# Patient Record
Sex: MALE | Race: WHITE | Employment: OTHER | URBAN - NONMETROPOLITAN AREA
[De-identification: names, ages, dates, MRNs, and addresses within clinical notes are randomized per-mention and may not be internally consistent; named-entity substitution may affect disease eponyms.]

---

## 2017-07-19 ENCOUNTER — HOSPITAL ENCOUNTER (OUTPATIENT)
Age: 52
Discharge: HOME | End: 2017-07-19
Payer: COMMERCIAL

## 2017-07-19 LAB
ABSOLUTE EOS #: 0.1 K/UL (ref 0–0.4)
ABSOLUTE LYMPH #: 0.8 K/UL (ref 0.9–2.5)
ABSOLUTE MONO #: 0.4 K/UL (ref 0–1)
ALBUMIN SERPL-MCNC: 4.6 G/DL (ref 3.5–5.2)
ALBUMIN/GLOBULIN RATIO: ABNORMAL (ref 1–2.5)
ALP BLD-CCNC: 59 U/L (ref 40–129)
ALT SERPL-CCNC: 7 U/L (ref 5–41)
ANION GAP SERPL CALCULATED.3IONS-SCNC: 14 MMOL/L (ref 9–17)
AST SERPL-CCNC: 13 U/L
BASOPHILS # BLD: 0 %
BASOPHILS ABSOLUTE: 0 K/UL (ref 0–0.2)
BILIRUB SERPL-MCNC: 0.69 MG/DL (ref 0.3–1.2)
BUN BLDV-MCNC: 23 MG/DL (ref 6–20)
BUN/CREAT BLD: 23 (ref 9–20)
CALCIUM SERPL-MCNC: 9.6 MG/DL (ref 8.6–10.4)
CHLORIDE BLD-SCNC: 100 MMOL/L (ref 98–107)
CO2: 24 MMOL/L (ref 20–31)
CREAT SERPL-MCNC: 0.98 MG/DL (ref 0.7–1.2)
DIFFERENTIAL TYPE: YES
EOSINOPHILS RELATIVE PERCENT: 1 %
GFR AFRICAN AMERICAN: >60 ML/MIN
GFR NON-AFRICAN AMERICAN: >60 ML/MIN
GFR SERPL CREATININE-BSD FRML MDRD: ABNORMAL ML/MIN/{1.73_M2}
GFR SERPL CREATININE-BSD FRML MDRD: ABNORMAL ML/MIN/{1.73_M2}
GLUCOSE BLD-MCNC: 123 MG/DL (ref 70–99)
HCT VFR BLD CALC: 42.5 % (ref 41–53)
HEMOGLOBIN: 14.6 G/DL (ref 13.5–17.5)
LYMPHOCYTES # BLD: 13 %
MCH RBC QN AUTO: 30.1 PG (ref 26–34)
MCHC RBC AUTO-ENTMCNC: 34.3 G/DL (ref 31–37)
MCV RBC AUTO: 87.6 FL (ref 80–100)
MONOCYTES # BLD: 7 %
PDW BLD-RTO: 13.1 % (ref 12.1–15.2)
PLATELET # BLD: 242 K/UL (ref 140–450)
PLATELET ESTIMATE: ABNORMAL
PMV BLD AUTO: ABNORMAL FL (ref 6–12)
POTASSIUM SERPL-SCNC: 4.1 MMOL/L (ref 3.7–5.3)
RBC # BLD: 4.86 M/UL (ref 4.5–5.9)
RBC # BLD: ABNORMAL 10*6/UL
SEG NEUTROPHILS: 79 %
SEGMENTED NEUTROPHILS ABSOLUTE COUNT: 4.6 K/UL (ref 2.1–6.5)
SODIUM BLD-SCNC: 138 MMOL/L (ref 135–144)
T4 TOTAL: 5.3 UG/DL (ref 4.5–12)
TOTAL PROTEIN: 7 G/DL (ref 6.4–8.3)
TSH SERPL DL<=0.05 MIU/L-ACNC: 1.46 MIU/L (ref 0.3–5)
WBC # BLD: 5.9 K/UL (ref 3.5–11)
WBC # BLD: ABNORMAL 10*3/UL

## 2017-07-19 PROCEDURE — 84443 ASSAY THYROID STIM HORMONE: CPT

## 2017-07-19 PROCEDURE — 80053 COMPREHEN METABOLIC PANEL: CPT

## 2017-07-19 PROCEDURE — 36415 COLL VENOUS BLD VENIPUNCTURE: CPT

## 2017-07-19 PROCEDURE — 85025 COMPLETE CBC W/AUTO DIFF WBC: CPT

## 2017-07-19 PROCEDURE — 84436 ASSAY OF TOTAL THYROXINE: CPT

## 2018-06-13 RX ORDER — QUETIAPINE FUMARATE 100 MG/1
TABLET, FILM COATED ORAL
Refills: 2 | COMMUNITY
Start: 2018-05-30 | End: 2018-07-11

## 2018-06-14 ENCOUNTER — OFFICE VISIT (OUTPATIENT)
Dept: FAMILY MEDICINE CLINIC | Age: 53
End: 2018-06-14
Payer: COMMERCIAL

## 2018-06-14 VITALS
WEIGHT: 158 LBS | SYSTOLIC BLOOD PRESSURE: 100 MMHG | RESPIRATION RATE: 18 BRPM | HEIGHT: 69 IN | DIASTOLIC BLOOD PRESSURE: 60 MMHG | BODY MASS INDEX: 23.4 KG/M2 | HEART RATE: 68 BPM

## 2018-06-14 DIAGNOSIS — R53.83 OTHER FATIGUE: ICD-10-CM

## 2018-06-14 DIAGNOSIS — R29.898 LEFT ARM WEAKNESS: ICD-10-CM

## 2018-06-14 DIAGNOSIS — R63.4 WEIGHT LOSS, UNINTENTIONAL: ICD-10-CM

## 2018-06-14 DIAGNOSIS — Z12.11 ENCOUNTER FOR SCREENING COLONOSCOPY: ICD-10-CM

## 2018-06-14 DIAGNOSIS — Z13.220 LIPID SCREENING: ICD-10-CM

## 2018-06-14 DIAGNOSIS — Z13.31 POSITIVE DEPRESSION SCREENING: ICD-10-CM

## 2018-06-14 DIAGNOSIS — K59.00 CONSTIPATION, UNSPECIFIED CONSTIPATION TYPE: Primary | ICD-10-CM

## 2018-06-14 DIAGNOSIS — M25.512 LEFT SHOULDER PAIN, UNSPECIFIED CHRONICITY: ICD-10-CM

## 2018-06-14 PROCEDURE — G8431 POS CLIN DEPRES SCRN F/U DOC: HCPCS | Performed by: INTERNAL MEDICINE

## 2018-06-14 PROCEDURE — 1036F TOBACCO NON-USER: CPT | Performed by: INTERNAL MEDICINE

## 2018-06-14 PROCEDURE — G8427 DOCREV CUR MEDS BY ELIG CLIN: HCPCS | Performed by: INTERNAL MEDICINE

## 2018-06-14 PROCEDURE — 3017F COLORECTAL CA SCREEN DOC REV: CPT | Performed by: INTERNAL MEDICINE

## 2018-06-14 PROCEDURE — 99204 OFFICE O/P NEW MOD 45 MIN: CPT | Performed by: INTERNAL MEDICINE

## 2018-06-14 PROCEDURE — G0444 DEPRESSION SCREEN ANNUAL: HCPCS | Performed by: INTERNAL MEDICINE

## 2018-06-14 PROCEDURE — G8420 CALC BMI NORM PARAMETERS: HCPCS | Performed by: INTERNAL MEDICINE

## 2018-06-14 RX ORDER — MELOXICAM 7.5 MG/1
7.5 TABLET ORAL DAILY
Qty: 30 TABLET | Refills: 0 | Status: SHIPPED | OUTPATIENT
Start: 2018-06-14 | End: 2018-07-12 | Stop reason: SDUPTHER

## 2018-06-14 ASSESSMENT — ENCOUNTER SYMPTOMS
COUGH: 0
SHORTNESS OF BREATH: 0
VOMITING: 0
DIARRHEA: 0
BLOATING: 0
NAUSEA: 0
HEMATOCHEZIA: 0
RECTAL PAIN: 0
ORTHOPNEA: 0
SORE THROAT: 0
ABDOMINAL PAIN: 0
HEARTBURN: 0
CONSTIPATION: 1
BLURRED VISION: 0

## 2018-06-14 ASSESSMENT — PATIENT HEALTH QUESTIONNAIRE - PHQ9
SUM OF ALL RESPONSES TO PHQ9 QUESTIONS 1 & 2: 6
4. FEELING TIRED OR HAVING LITTLE ENERGY: 3
2. FEELING DOWN, DEPRESSED OR HOPELESS: 3
5. POOR APPETITE OR OVEREATING: 2
10. IF YOU CHECKED OFF ANY PROBLEMS, HOW DIFFICULT HAVE THESE PROBLEMS MADE IT FOR YOU TO DO YOUR WORK, TAKE CARE OF THINGS AT HOME, OR GET ALONG WITH OTHER PEOPLE: 3
3. TROUBLE FALLING OR STAYING ASLEEP: 3
8. MOVING OR SPEAKING SO SLOWLY THAT OTHER PEOPLE COULD HAVE NOTICED. OR THE OPPOSITE, BEING SO FIGETY OR RESTLESS THAT YOU HAVE BEEN MOVING AROUND A LOT MORE THAN USUAL: 3
7. TROUBLE CONCENTRATING ON THINGS, SUCH AS READING THE NEWSPAPER OR WATCHING TELEVISION: 3
SUM OF ALL RESPONSES TO PHQ QUESTIONS 1-9: 25
9. THOUGHTS THAT YOU WOULD BE BETTER OFF DEAD, OR OF HURTING YOURSELF: 2
6. FEELING BAD ABOUT YOURSELF - OR THAT YOU ARE A FAILURE OR HAVE LET YOURSELF OR YOUR FAMILY DOWN: 3
1. LITTLE INTEREST OR PLEASURE IN DOING THINGS: 3

## 2018-06-15 ENCOUNTER — HOSPITAL ENCOUNTER (OUTPATIENT)
Dept: GENERAL RADIOLOGY | Age: 53
Discharge: HOME | End: 2018-06-17
Payer: COMMERCIAL

## 2018-06-15 ENCOUNTER — HOSPITAL ENCOUNTER (OUTPATIENT)
Age: 53
Discharge: HOME | End: 2018-06-15
Payer: COMMERCIAL

## 2018-06-15 ENCOUNTER — HOSPITAL ENCOUNTER (OUTPATIENT)
Age: 53
End: 2018-06-15
Payer: COMMERCIAL

## 2018-06-15 DIAGNOSIS — R63.4 WEIGHT LOSS, UNINTENTIONAL: ICD-10-CM

## 2018-06-15 DIAGNOSIS — M25.512 ACUTE PAIN OF LEFT SHOULDER: ICD-10-CM

## 2018-06-15 DIAGNOSIS — M25.512 ACUTE PAIN OF LEFT SHOULDER: Primary | ICD-10-CM

## 2018-06-15 DIAGNOSIS — M25.512 LEFT SHOULDER PAIN, UNSPECIFIED CHRONICITY: ICD-10-CM

## 2018-06-15 DIAGNOSIS — Z13.220 LIPID SCREENING: ICD-10-CM

## 2018-06-15 DIAGNOSIS — R53.83 OTHER FATIGUE: ICD-10-CM

## 2018-06-15 LAB
ABSOLUTE EOS #: 0.1 K/UL (ref 0–0.4)
ABSOLUTE IMMATURE GRANULOCYTE: NORMAL K/UL (ref 0–0.3)
ABSOLUTE LYMPH #: 1 K/UL (ref 1–4.8)
ABSOLUTE MONO #: 0.3 K/UL (ref 0–1)
ANION GAP SERPL CALCULATED.3IONS-SCNC: 10 MMOL/L (ref 9–17)
BASOPHILS # BLD: 1 % (ref 0–2)
BASOPHILS ABSOLUTE: 0 K/UL (ref 0–0.2)
BUN BLDV-MCNC: 15 MG/DL (ref 6–20)
BUN/CREAT BLD: 15 (ref 9–20)
CALCIUM SERPL-MCNC: 10 MG/DL (ref 8.6–10.4)
CHLORIDE BLD-SCNC: 99 MMOL/L (ref 98–107)
CHOLESTEROL/HDL RATIO: 3.8
CHOLESTEROL: 238 MG/DL
CO2: 28 MMOL/L (ref 20–31)
CREAT SERPL-MCNC: 1 MG/DL (ref 0.7–1.2)
DIFFERENTIAL TYPE: YES
EOSINOPHILS RELATIVE PERCENT: 3 % (ref 0–5)
GFR AFRICAN AMERICAN: >60 ML/MIN
GFR NON-AFRICAN AMERICAN: >60 ML/MIN
GFR SERPL CREATININE-BSD FRML MDRD: ABNORMAL ML/MIN/{1.73_M2}
GFR SERPL CREATININE-BSD FRML MDRD: ABNORMAL ML/MIN/{1.73_M2}
GLUCOSE BLD-MCNC: 101 MG/DL (ref 70–99)
HCT VFR BLD CALC: 42.5 % (ref 41–53)
HDLC SERPL-MCNC: 62 MG/DL
HEMOGLOBIN: 14.7 G/DL (ref 13.5–17.5)
IMMATURE GRANULOCYTES: NORMAL %
LDL CHOLESTEROL: 158 MG/DL (ref 0–130)
LYMPHOCYTES # BLD: 24 % (ref 13–44)
MCH RBC QN AUTO: 30 PG (ref 26–34)
MCHC RBC AUTO-ENTMCNC: 34.5 G/DL (ref 31–37)
MCV RBC AUTO: 86.7 FL (ref 80–100)
MONOCYTES # BLD: 7 % (ref 5–9)
NRBC AUTOMATED: NORMAL PER 100 WBC
PATIENT FASTING?: YES
PDW BLD-RTO: 12.8 % (ref 12.1–15.2)
PLATELET # BLD: 244 K/UL (ref 140–450)
PLATELET ESTIMATE: NORMAL
PMV BLD AUTO: NORMAL FL (ref 6–12)
POTASSIUM SERPL-SCNC: 4.4 MMOL/L (ref 3.7–5.3)
PROSTATE SPECIFIC ANTIGEN: 0.8 UG/L
RBC # BLD: 4.9 M/UL (ref 4.5–5.9)
RBC # BLD: NORMAL 10*6/UL
SEG NEUTROPHILS: 65 % (ref 39–75)
SEGMENTED NEUTROPHILS ABSOLUTE COUNT: 2.8 K/UL (ref 2.1–6.5)
SODIUM BLD-SCNC: 137 MMOL/L (ref 135–144)
TRIGL SERPL-MCNC: 89 MG/DL
TSH SERPL DL<=0.05 MIU/L-ACNC: 3.1 MIU/L (ref 0.3–5)
VLDLC SERPL CALC-MCNC: ABNORMAL MG/DL (ref 1–30)
WBC # BLD: 4.2 K/UL (ref 3.5–11)
WBC # BLD: NORMAL 10*3/UL

## 2018-06-15 PROCEDURE — 73030 X-RAY EXAM OF SHOULDER: CPT

## 2018-06-15 PROCEDURE — 80061 LIPID PANEL: CPT

## 2018-06-15 PROCEDURE — 85025 COMPLETE CBC W/AUTO DIFF WBC: CPT

## 2018-06-15 PROCEDURE — 84443 ASSAY THYROID STIM HORMONE: CPT

## 2018-06-15 PROCEDURE — G0103 PSA SCREENING: HCPCS

## 2018-06-15 PROCEDURE — 36415 COLL VENOUS BLD VENIPUNCTURE: CPT

## 2018-06-15 PROCEDURE — 80048 BASIC METABOLIC PNL TOTAL CA: CPT

## 2018-06-19 ENCOUNTER — OFFICE VISIT (OUTPATIENT)
Dept: SURGERY | Age: 53
End: 2018-06-19
Payer: COMMERCIAL

## 2018-06-19 VITALS
HEART RATE: 82 BPM | RESPIRATION RATE: 18 BRPM | BODY MASS INDEX: 23.49 KG/M2 | HEIGHT: 68 IN | DIASTOLIC BLOOD PRESSURE: 70 MMHG | SYSTOLIC BLOOD PRESSURE: 111 MMHG | WEIGHT: 155 LBS

## 2018-06-19 DIAGNOSIS — K59.00 CONSTIPATION, UNSPECIFIED CONSTIPATION TYPE: Primary | ICD-10-CM

## 2018-06-19 DIAGNOSIS — Z01.818 PREOPERATIVE TESTING: ICD-10-CM

## 2018-06-19 PROCEDURE — 99203 OFFICE O/P NEW LOW 30 MIN: CPT | Performed by: SURGERY

## 2018-06-19 PROCEDURE — 1036F TOBACCO NON-USER: CPT | Performed by: SURGERY

## 2018-06-19 PROCEDURE — G8427 DOCREV CUR MEDS BY ELIG CLIN: HCPCS | Performed by: SURGERY

## 2018-06-19 PROCEDURE — G8420 CALC BMI NORM PARAMETERS: HCPCS | Performed by: SURGERY

## 2018-06-19 PROCEDURE — 3017F COLORECTAL CA SCREEN DOC REV: CPT | Performed by: SURGERY

## 2018-06-19 RX ORDER — SODIUM, POTASSIUM,MAG SULFATES 17.5-3.13G
1 SOLUTION, RECONSTITUTED, ORAL ORAL ONCE
Qty: 2 BOTTLE | Refills: 0 | Status: CANCELLED | OUTPATIENT
Start: 2018-06-19 | End: 2018-06-19

## 2018-06-19 RX ORDER — POLYETHYLENE GLYCOL 3350, SODIUM CHLORIDE, POTASSIUM CHLORIDE, SODIUM BICARBONATE, AND SODIUM SULFATE 240; 5.84; 2.98; 6.72; 22.72 G/4L; G/4L; G/4L; G/4L; G/4L
4000 POWDER, FOR SOLUTION ORAL ONCE
Qty: 4000 ML | Refills: 0 | Status: SHIPPED | OUTPATIENT
Start: 2018-06-19 | End: 2018-06-19

## 2018-06-21 ENCOUNTER — HOSPITAL ENCOUNTER (OUTPATIENT)
Age: 53
Discharge: HOME | End: 2018-06-21
Payer: COMMERCIAL

## 2018-06-21 LAB
EKG ATRIAL RATE: 72 BPM
EKG P AXIS: 55 DEGREES
EKG P-R INTERVAL: 184 MS
EKG Q-T INTERVAL: 400 MS
EKG QRS DURATION: 110 MS
EKG QTC CALCULATION (BAZETT): 438 MS
EKG R AXIS: 61 DEGREES
EKG T AXIS: 57 DEGREES
EKG VENTRICULAR RATE: 72 BPM

## 2018-06-21 PROCEDURE — 93005 ELECTROCARDIOGRAM TRACING: CPT

## 2018-06-22 ENCOUNTER — HOSPITAL ENCOUNTER (OUTPATIENT)
Dept: CT IMAGING | Age: 53
Discharge: HOME | End: 2018-06-24
Payer: COMMERCIAL

## 2018-06-22 DIAGNOSIS — R29.898 LEFT ARM WEAKNESS: ICD-10-CM

## 2018-06-22 PROCEDURE — 70450 CT HEAD/BRAIN W/O DYE: CPT

## 2018-07-03 ENCOUNTER — APPOINTMENT (OUTPATIENT)
Dept: CT IMAGING | Age: 53
End: 2018-07-03
Payer: COMMERCIAL

## 2018-07-03 ENCOUNTER — HOSPITAL ENCOUNTER (EMERGENCY)
Age: 53
Discharge: HOME | End: 2018-07-03
Attending: FAMILY MEDICINE
Payer: COMMERCIAL

## 2018-07-03 VITALS
HEART RATE: 68 BPM | SYSTOLIC BLOOD PRESSURE: 135 MMHG | DIASTOLIC BLOOD PRESSURE: 89 MMHG | RESPIRATION RATE: 18 BRPM | OXYGEN SATURATION: 98 % | TEMPERATURE: 98.8 F

## 2018-07-03 DIAGNOSIS — R31.21 ASYMPTOMATIC MICROSCOPIC HEMATURIA: Primary | ICD-10-CM

## 2018-07-03 DIAGNOSIS — R10.31 ABDOMINAL PAIN, RIGHT LOWER QUADRANT: ICD-10-CM

## 2018-07-03 DIAGNOSIS — Z87.442 PERSONAL HISTORY OF KIDNEY STONES: ICD-10-CM

## 2018-07-03 LAB
-: ABNORMAL
ABSOLUTE EOS #: 0.2 K/UL (ref 0–0.4)
ABSOLUTE IMMATURE GRANULOCYTE: ABNORMAL K/UL (ref 0–0.3)
ABSOLUTE LYMPH #: 2.4 K/UL (ref 1–4.8)
ABSOLUTE MONO #: 0.6 K/UL (ref 0–1)
ALBUMIN SERPL-MCNC: 4.5 G/DL (ref 3.5–5.2)
ALBUMIN/GLOBULIN RATIO: ABNORMAL (ref 1–2.5)
ALP BLD-CCNC: 66 U/L (ref 40–129)
ALT SERPL-CCNC: 7 U/L (ref 5–41)
AMORPHOUS: ABNORMAL
ANION GAP SERPL CALCULATED.3IONS-SCNC: 12 MMOL/L (ref 9–17)
AST SERPL-CCNC: 13 U/L
BACTERIA: ABNORMAL
BASOPHILS # BLD: 0 % (ref 0–2)
BASOPHILS ABSOLUTE: 0 K/UL (ref 0–0.2)
BILIRUB SERPL-MCNC: 0.41 MG/DL (ref 0.3–1.2)
BILIRUBIN URINE: NEGATIVE
BUN BLDV-MCNC: 32 MG/DL (ref 6–20)
BUN/CREAT BLD: 26 (ref 9–20)
CALCIUM SERPL-MCNC: 9.6 MG/DL (ref 8.6–10.4)
CASTS UA: ABNORMAL /LPF
CHLORIDE BLD-SCNC: 102 MMOL/L (ref 98–107)
CO2: 27 MMOL/L (ref 20–31)
COLOR: ABNORMAL
COMMENT UA: ABNORMAL
CREAT SERPL-MCNC: 1.21 MG/DL (ref 0.7–1.2)
CRYSTALS, UA: ABNORMAL /HPF
DIFFERENTIAL TYPE: YES
EOSINOPHILS RELATIVE PERCENT: 2 % (ref 0–5)
EPITHELIAL CELLS UA: ABNORMAL /HPF
GFR AFRICAN AMERICAN: >60 ML/MIN
GFR NON-AFRICAN AMERICAN: >60 ML/MIN
GFR SERPL CREATININE-BSD FRML MDRD: ABNORMAL ML/MIN/{1.73_M2}
GFR SERPL CREATININE-BSD FRML MDRD: ABNORMAL ML/MIN/{1.73_M2}
GLUCOSE BLD-MCNC: 152 MG/DL (ref 70–99)
GLUCOSE URINE: NEGATIVE
HCT VFR BLD CALC: 40.4 % (ref 41–53)
HEMOGLOBIN: 13.9 G/DL (ref 13.5–17.5)
IMMATURE GRANULOCYTES: ABNORMAL %
INR BLD: 1.1
KETONES, URINE: ABNORMAL
LACTIC ACID, WHOLE BLOOD: NORMAL MMOL/L (ref 0.7–2.1)
LACTIC ACID: 1.8 MMOL/L (ref 0.5–2.2)
LEUKOCYTE ESTERASE, URINE: NEGATIVE
LIPASE: 37 U/L (ref 13–60)
LYMPHOCYTES # BLD: 32 % (ref 13–44)
MCH RBC QN AUTO: 30.1 PG (ref 26–34)
MCHC RBC AUTO-ENTMCNC: 34.4 G/DL (ref 31–37)
MCV RBC AUTO: 87.6 FL (ref 80–100)
MONOCYTES # BLD: 8 % (ref 5–9)
MUCUS: ABNORMAL
NITRITE, URINE: NEGATIVE
NRBC AUTOMATED: ABNORMAL PER 100 WBC
OTHER OBSERVATIONS UA: ABNORMAL
PDW BLD-RTO: 13.1 % (ref 12.1–15.2)
PH UA: 5 (ref 5–8)
PLATELET # BLD: 270 K/UL (ref 140–450)
PLATELET ESTIMATE: ABNORMAL
PMV BLD AUTO: ABNORMAL FL (ref 6–12)
POTASSIUM SERPL-SCNC: 4.4 MMOL/L (ref 3.7–5.3)
PROTEIN UA: ABNORMAL
PROTHROMBIN TIME: 10.5 SEC (ref 9–11.6)
RBC # BLD: 4.61 M/UL (ref 4.5–5.9)
RBC # BLD: ABNORMAL 10*6/UL
RBC UA: ABNORMAL /HPF (ref 0–2)
RENAL EPITHELIAL, UA: ABNORMAL /HPF
SEG NEUTROPHILS: 58 % (ref 39–75)
SEGMENTED NEUTROPHILS ABSOLUTE COUNT: 4.3 K/UL (ref 2.1–6.5)
SODIUM BLD-SCNC: 141 MMOL/L (ref 135–144)
SPECIFIC GRAVITY UA: 1.01 (ref 1–1.03)
TOTAL PROTEIN: 7 G/DL (ref 6.4–8.3)
TRICHOMONAS: ABNORMAL
TURBIDITY: ABNORMAL
URINE HGB: ABNORMAL
UROBILINOGEN, URINE: NORMAL
WBC # BLD: 7.4 K/UL (ref 3.5–11)
WBC # BLD: ABNORMAL 10*3/UL
WBC UA: ABNORMAL /HPF
YEAST: ABNORMAL

## 2018-07-03 PROCEDURE — 74174 CTA ABD&PLVS W/CONTRAST: CPT

## 2018-07-03 PROCEDURE — 99284 EMERGENCY DEPT VISIT MOD MDM: CPT

## 2018-07-03 PROCEDURE — 83690 ASSAY OF LIPASE: CPT

## 2018-07-03 PROCEDURE — 36415 COLL VENOUS BLD VENIPUNCTURE: CPT

## 2018-07-03 PROCEDURE — 6360000002 HC RX W HCPCS: Performed by: FAMILY MEDICINE

## 2018-07-03 PROCEDURE — 85610 PROTHROMBIN TIME: CPT

## 2018-07-03 PROCEDURE — 2580000003 HC RX 258: Performed by: FAMILY MEDICINE

## 2018-07-03 PROCEDURE — 83605 ASSAY OF LACTIC ACID: CPT

## 2018-07-03 PROCEDURE — 6370000000 HC RX 637 (ALT 250 FOR IP): Performed by: FAMILY MEDICINE

## 2018-07-03 PROCEDURE — 80053 COMPREHEN METABOLIC PANEL: CPT

## 2018-07-03 PROCEDURE — 6360000004 HC RX CONTRAST MEDICATION: Performed by: FAMILY MEDICINE

## 2018-07-03 PROCEDURE — 81001 URINALYSIS AUTO W/SCOPE: CPT

## 2018-07-03 PROCEDURE — 85025 COMPLETE CBC W/AUTO DIFF WBC: CPT

## 2018-07-03 PROCEDURE — 96374 THER/PROPH/DIAG INJ IV PUSH: CPT

## 2018-07-03 RX ORDER — TAMSULOSIN HYDROCHLORIDE 0.4 MG/1
0.4 CAPSULE ORAL DAILY
Status: DISCONTINUED | OUTPATIENT
Start: 2018-07-03 | End: 2018-07-03 | Stop reason: HOSPADM

## 2018-07-03 RX ORDER — HYDROCODONE BITARTRATE AND ACETAMINOPHEN 5; 325 MG/1; MG/1
1 TABLET ORAL EVERY 6 HOURS PRN
Qty: 10 TABLET | Refills: 0 | Status: SHIPPED | OUTPATIENT
Start: 2018-07-03 | End: 2018-07-06

## 2018-07-03 RX ORDER — ONDANSETRON 2 MG/ML
4 INJECTION INTRAMUSCULAR; INTRAVENOUS ONCE
Status: COMPLETED | OUTPATIENT
Start: 2018-07-03 | End: 2018-07-03

## 2018-07-03 RX ORDER — 0.9 % SODIUM CHLORIDE 0.9 %
500 INTRAVENOUS SOLUTION INTRAVENOUS ONCE
Status: COMPLETED | OUTPATIENT
Start: 2018-07-03 | End: 2018-07-03

## 2018-07-03 RX ORDER — TAMSULOSIN HYDROCHLORIDE 0.4 MG/1
0.4 CAPSULE ORAL DAILY
Qty: 10 CAPSULE | Refills: 0 | Status: SHIPPED | OUTPATIENT
Start: 2018-07-03 | End: 2019-03-13 | Stop reason: ALTCHOICE

## 2018-07-03 RX ADMIN — IOPAMIDOL 100 ML: 755 INJECTION, SOLUTION INTRAVENOUS at 01:40

## 2018-07-03 RX ADMIN — ONDANSETRON 4 MG: 2 INJECTION, SOLUTION INTRAMUSCULAR; INTRAVENOUS at 01:53

## 2018-07-03 RX ADMIN — TAMSULOSIN HYDROCHLORIDE 0.4 MG: 0.4 CAPSULE ORAL at 04:40

## 2018-07-03 RX ADMIN — SODIUM CHLORIDE 500 ML: 9 INJECTION, SOLUTION INTRAVENOUS at 01:19

## 2018-07-03 ASSESSMENT — PAIN DESCRIPTION - FREQUENCY: FREQUENCY: CONTINUOUS

## 2018-07-03 ASSESSMENT — PAIN DESCRIPTION - ORIENTATION: ORIENTATION: RIGHT;LOWER

## 2018-07-03 ASSESSMENT — PAIN DESCRIPTION - LOCATION: LOCATION: ABDOMEN

## 2018-07-03 ASSESSMENT — PAIN SCALES - GENERAL: PAINLEVEL_OUTOF10: 9

## 2018-07-03 ASSESSMENT — PAIN DESCRIPTION - PAIN TYPE: TYPE: ACUTE PAIN

## 2018-07-03 ASSESSMENT — PAIN DESCRIPTION - DESCRIPTORS: DESCRIPTORS: TENDER;SHARP

## 2018-07-03 NOTE — ED PROVIDER NOTES
of motion and normal strength all extremities appropriate to age. Neurological: Patient is alert and oriented to person, place, and time. patient displays no tremor. Patient displays no seizure activity. .    Skin: Skin is warm and dry. Patient is not diaphoretic. Psychiatric: Patient has a normal mood and somewhat quiet/flattened affect. Patient speech is normal and behavior is normal. Cognition and memory are normal.      DIFFERENTIAL DIAGNOSIS:   Appendicitis kidney stone pyelonephritis MSK aneurysm, constipation    DIAGNOSTIC RESULTS           RADIOLOGY: non-plain film images(s) such as CT, Ultrasound and MRI are read by the radiologist.  CTA ABDOMEN PELVIS W WO CONTRAST   Preliminary Result   Preliminary report only            Unremarkable CTA of the abdomen pelvis. There is actually a paucity of    atherosclerotic disease present. There certainly no aneurysm, dissection, or    focal stenosis present. No acute process is identified at the abdomen or pelvis.           LABS:   Labs Reviewed   CBC WITH AUTO DIFFERENTIAL - Abnormal; Notable for the following:        Result Value    Hematocrit 40.4 (*)     All other components within normal limits   COMPREHENSIVE METABOLIC PANEL - Abnormal; Notable for the following:     Glucose 152 (*)     BUN 32 (*)     CREATININE 1.21 (*)     Bun/Cre Ratio 26 (*)     All other components within normal limits   URINALYSIS WITH MICROSCOPIC - Abnormal; Notable for the following:     Color, UA ROSIBEL (*)     Turbidity UA HAZY (*)     Ketones, Urine TRACE (*)     Urine Hgb 3+ (*)     Protein, UA TRACE (*)     Yeast, UA OCCASIONAL (*)     All other components within normal limits   LIPASE   LACTIC ACID, PLASMA   PROTIME-INR       EMERGENCY DEPARTMENT COURSE:   Vitals:    Vitals:    07/03/18 0356 07/03/18 0404 07/03/18 0419 07/03/18 0434   BP: 110/89 115/71 (!) 141/99 135/89   Pulse:       Resp:       Temp:       TempSrc:       SpO2: 99% 99% 99% 98%     Patient history and physical exam taken at bedside with father present, discussed patient's symptoms and exam findings, discussed initial workup will include blood and urine studies, IV saline lock, will order CT abdomen and pelvis with IV contrast and ask radiology technician to allow imaging to go more caudad to catch more of the iliac arteries if possible. Patient resting in bed semi-Fowlers    EMR reviewed, noting history of depression, history of toe surgery, never smoker, medications reviewed    Patient was offered pain medication as well but declines at this time    Upon return from CT, patient did vomit, Zofran 4 mg IV ordered    Patient updated regarding overall lab workup, preliminary CT read, awaiting urinalysis, patient resting left lateral recumbent, father at bedside    Urinalysis reviewed    Discussed the patient overall workup, discussed his report right lower quadrant pain, personal history kidney stones, noted blood in the urine, discussed possible patient's having kidney stone at this time I'll he states in the past he barely can feel them. As patient has normal kidney function, and symptom onset is racing, we'll initiate patient on Flomax, prescription same, discussed importance of hydration, pain control, follow-up with PCP, we'll make referral to urology, return to ER symptoms change worsen or concerns    Patient has a very flattened affect throughout, attempted to question how much patient does understand questions and/or instructions, much of this was repeated the patient's father who is present in attendance throughout. FINAL IMPRESSION      1. Asymptomatic microscopic hematuria    2. Abdominal pain, right lower quadrant    3.  Personal history of kidney stones          DISPOSITION/PLAN   Discharge    PATIENT REFERRED TO:  [facility]    Call   As needed    Rod White MD  [address] [facility] [address] [phone]    Call   As needed    [facility]  [address] [facility]

## 2018-07-11 RX ORDER — QUETIAPINE FUMARATE 100 MG/1
TABLET, FILM COATED ORAL
Status: ON HOLD | COMMUNITY
End: 2019-01-17 | Stop reason: HOSPADM

## 2018-07-11 RX ORDER — MIRTAZAPINE 30 MG/1
TABLET, FILM COATED ORAL
Status: ON HOLD | COMMUNITY
End: 2019-01-17 | Stop reason: HOSPADM

## 2018-07-11 RX ORDER — TRAZODONE HYDROCHLORIDE 150 MG/1
TABLET ORAL
Status: ON HOLD | COMMUNITY
End: 2019-01-17 | Stop reason: HOSPADM

## 2018-07-12 ENCOUNTER — OFFICE VISIT (OUTPATIENT)
Dept: FAMILY MEDICINE CLINIC | Age: 53
End: 2018-07-12
Payer: COMMERCIAL

## 2018-07-12 VITALS
DIASTOLIC BLOOD PRESSURE: 60 MMHG | BODY MASS INDEX: 24.55 KG/M2 | WEIGHT: 162 LBS | HEIGHT: 68 IN | RESPIRATION RATE: 18 BRPM | HEART RATE: 80 BPM | SYSTOLIC BLOOD PRESSURE: 100 MMHG

## 2018-07-12 DIAGNOSIS — R53.83 OTHER FATIGUE: ICD-10-CM

## 2018-07-12 DIAGNOSIS — M19.012 OSTEOARTHRITIS OF LEFT SHOULDER, UNSPECIFIED OSTEOARTHRITIS TYPE: Primary | ICD-10-CM

## 2018-07-12 DIAGNOSIS — F32.A DEPRESSIVE DISORDER: ICD-10-CM

## 2018-07-12 PROCEDURE — 3017F COLORECTAL CA SCREEN DOC REV: CPT | Performed by: INTERNAL MEDICINE

## 2018-07-12 PROCEDURE — 1036F TOBACCO NON-USER: CPT | Performed by: INTERNAL MEDICINE

## 2018-07-12 PROCEDURE — 99213 OFFICE O/P EST LOW 20 MIN: CPT | Performed by: INTERNAL MEDICINE

## 2018-07-12 PROCEDURE — G8427 DOCREV CUR MEDS BY ELIG CLIN: HCPCS | Performed by: INTERNAL MEDICINE

## 2018-07-12 PROCEDURE — G8420 CALC BMI NORM PARAMETERS: HCPCS | Performed by: INTERNAL MEDICINE

## 2018-07-12 RX ORDER — MELOXICAM 7.5 MG/1
7.5 TABLET ORAL DAILY
Qty: 30 TABLET | Refills: 2 | Status: SHIPPED | OUTPATIENT
Start: 2018-07-12 | End: 2019-01-22

## 2018-07-12 ASSESSMENT — ENCOUNTER SYMPTOMS
SORE THROAT: 0
NAUSEA: 0
COUGH: 0
BLURRED VISION: 0
ABDOMINAL PAIN: 0
SHORTNESS OF BREATH: 0
HEARTBURN: 0

## 2018-07-12 NOTE — PROGRESS NOTES
kg)   BMI 24.63 kg/m²       Physical Exam   Constitutional: He is oriented to person, place, and time. He appears well-developed and well-nourished. No distress. HENT:   Head: Normocephalic and atraumatic. Mouth/Throat: Oropharynx is clear and moist. No oropharyngeal exudate. Neck: No thyromegaly present. Cardiovascular: Normal rate, regular rhythm and normal heart sounds. No murmur heard. Pulmonary/Chest: Effort normal and breath sounds normal. He has no wheezes. He has no rales. Abdominal: Soft. Bowel sounds are normal. He exhibits no distension and no mass. There is no tenderness. Musculoskeletal: Normal range of motion. He exhibits tenderness (left shoulder). Lymphadenopathy:     He has no cervical adenopathy. Neurological: He is alert and oriented to person, place, and time. No cranial nerve deficit. Flat affect   Skin: Skin is warm and dry. No rash noted. Psychiatric: He has a normal mood and affect. His behavior is normal. Judgment normal.   Vitals reviewed. Data:     Lab Results   Component Value Date     07/03/2018    K 4.4 07/03/2018     07/03/2018    CO2 27 07/03/2018    BUN 32 07/03/2018    CREATININE 1.21 07/03/2018    GLUCOSE 152 07/03/2018    PROT 7.0 07/03/2018    LABALBU 4.5 07/03/2018    BILITOT 0.41 07/03/2018    ALKPHOS 66 07/03/2018    AST 13 07/03/2018    ALT 7 07/03/2018     Lab Results   Component Value Date    WBC 7.4 07/03/2018    RBC 4.61 07/03/2018    HGB 13.9 07/03/2018    HCT 40.4 07/03/2018    MCV 87.6 07/03/2018    MCH 30.1 07/03/2018    MCHC 34.4 07/03/2018    RDW 13.1 07/03/2018     07/03/2018    MPV NOT REPORTED 07/03/2018     Lab Results   Component Value Date    TSH 3.10 06/15/2018     Lab Results   Component Value Date    CHOL 238 06/15/2018    HDL 62 06/15/2018    PSA 0.80 06/15/2018          Assessment & Plan        Diagnosis Orders   1.  Osteoarthritis of left shoulder, unspecified osteoarthritis type   [place] reports improvement with Mobic. Left shoulder x-ray from 6/15/18  revealed moderate osteoarthritis. We'll continue with meloxicam to help with pain and inflammation. Discussed referral to orthopedic surgeon in the future if pain gets worse meloxicam (MOBIC) 7.5 MG tablet   2. Depressive disorder   Corky follows up with psychiatrist Dr. Steffanie Marshall    3. Other fatigue   His TSH was normal, CBC and BMP unremarkable. CT head was unremarkable. Suspect fatigue is related to side effects from psych meds and also severe depression. Completed Refills   Requested Prescriptions     Signed Prescriptions Disp Refills    meloxicam (MOBIC) 7.5 MG tablet 30 tablet 2     Sig: Take 1 tablet by mouth daily     Return in about 6 months (around 1/12/2019) for arthritis. Orders Placed This Encounter   Medications    meloxicam (MOBIC) 7.5 MG tablet     Sig: Take 1 tablet by mouth daily     Dispense:  30 tablet     Refill:  2     No orders of the defined types were placed in this encounter. There are no Patient Instructions on file for this visit.     Electronically signed by Keyanna Barraza MD on 7/12/2018 at 9:31 AM           Completed Refills   Requested Prescriptions     Signed Prescriptions Disp Refills    meloxicam (MOBIC) 7.5 MG tablet 30 tablet 2     Sig: Take 1 tablet by mouth daily

## 2018-07-15 ASSESSMENT — ENCOUNTER SYMPTOMS
VOMITING: 0
SORE THROAT: 0
CHOKING: 0
CONSTIPATION: 1
TROUBLE SWALLOWING: 0
ABDOMINAL DISTENTION: 1
BACK PAIN: 0
SHORTNESS OF BREATH: 0
NAUSEA: 0
BLOOD IN STOOL: 0
COUGH: 0
ABDOMINAL PAIN: 0

## 2018-07-15 NOTE — PROGRESS NOTES
and affect. His behavior is normal. Judgment and thought content normal.   Nursing note and vitals reviewed. CTA ABDOMEN PELVIS W WO CONTRAST   Status: Final result   Order Providers     Authorizing Billing   MD Mike Anne MD   Replaced:  CT ABDOMEN PELVIS W IV CONTRAST          Signed by     Signed Date/Time  Phone Pager   Jesse Carlsoncarlo 7/03/2018 06:19 [phone]    [facility]     Read Date Phone Pager   Thierrymohsen Gonzalez Jul 3, 2018 [phone]    Routing History     Priority Sent On From To Message Type    7/3/2018  6:23 AM Atul, [facility] Incoming Radiant Results From [email or web address] P [facility] Ed Radiology F/U Results   Radiation Dose Estimates     No radiation information found for this patient   Narrative   CTA ABDOMEN PELVIS W WO CONTRAST       HISTORY: 59-year-old male with right sided abdominal pain, evaluate for a    possible aneurysm or dissection.       COMPARISON: None.           TECHNIQUE: Spiral axial CT imaging of the abdomen and pelvis was performed at    2.5 mm intervals throughout. 100 mL Isovue-370 was administered intravenously    and imaging was performed contrast in the arterial phase. Sagittal, coronal,    and MIP reconstructions are provided.           FINDINGS: There is good contrast opacification of the systemic arterial    structures. Visualized descending thoracic aorta and proximal abdominal aorta    are normal. Celiac axis, SMA, renal arteries, and VIKRAM are widely patent. There    some minimal calcified plaque along the infrarenal abdominal aorta and bottle    common iliacs. There is no aneurysm or dissection. Bilateral internal and    external iliac arteries are unremarkable. There is good runoff to the lower    extremities.       The liver is largely unremarkable. There is a tiny hypoattenuating focus in the    medial segment which is too small for characterization, measuring approximately    7 mm.  Given the size and conspicuity, I suspect a small cyst. The gallbladder,    spleen, pancreas, adrenal glands, and kidneys demonstrate an unremarkable    contrasted appearance. Colon is unremarkable. No wall thickening or pericolonic    inflammation is present. Normal appendix is visualized. Small bowel is normal    in caliber and course. Stomach is normal. There is no mass or adenopathy. No    free air or free fluid is present. Mild degenerative changes are present in the    hips and spine. Visualized lung bases are clear.               Impression       Unremarkable CTA of the abdomen and pelvis. There is actually a paucity of    atherosclerotic disease present. There is certainly no aneurysm, dissection, or    focal stenosis present. No acute process is identified at the abdomen or pelvis.      7/3/2018  1:20 AM - Atul, [facility] Incoming Lab Results From [facility]     Component Results     Component Value Ref Range & Units Status Collected Lab   WBC 7.4  3.5 - 11.0 k/uL Final 07/03/2018  1:15 AM [facility] Lab   RBC 4.61  4.5 - 5.9 m/uL Final 07/03/2018  1:15 AM [facility] Lab   Hemoglobin 13.9  13.5 - 17.5 g/dL Final 07/03/2018  1:15 AM [facility] Lab   Hematocrit 40.4   41 - 53 % Final 07/03/2018  1:15 AM [facility] Lab   MCV 87.6  80 - 100 fL Final 07/03/2018  1:15 AM [address] Lab   MCH 30.1  26 - 34 pg Final 07/03/2018  1:15 AM [facility] Lab   MCHC 34.4  31 - 37 g/dL Final 07/03/2018  1:15 AM [facility] Lab   RDW 13.1  12.1 - 15.2 % Final 07/03/2018  1:15 AM [address] Lab   Platelets 197  225 - 450 k/uL Final 07/03/2018  1:15 AM [address] Lab             7/3/2018  1:36 AM - Atul, pn Incoming Lab Results From [facility]     Component Results     Component Value Ref Range & Units Status Collected Lab   Glucose 152   70 - 99 mg/dL Final 07/03/2018  1:15 AM [address] Lab   BUN 32   6 - 20 mg/dL Final 07/03/2018  1:15 AM [address] Lab   CREATININE 1.21

## 2018-07-15 NOTE — PATIENT INSTRUCTIONS
Patient Education   Patient Education        Learning About Colonoscopy  What is a colonoscopy? A colonoscopy is a test (also called a procedure) that lets a doctor look inside your large intestine. The doctor uses a thin, lighted tube called a colonoscope. The doctor uses it to look for small growths called polyps, colon or rectal cancer (colorectal cancer), or other problems like bleeding. During the procedure, the doctor can take samples of tissue. The samples can then be checked for cancer or other conditions. The doctor can also take out polyps. How is colonoscopy done? This procedure is done in a doctor's office or a clinic or hospital. You will get medicine to help you relax and not feel pain. Some people find that they do not remember having the test because of the medicine. The doctor gently moves the colonoscope, or scope, through the colon. The scope is also a small video camera. It lets the doctor see the colon and take pictures. A colonoscopy usually takes 30 to 45 minutes. It may take longer if the doctor has to remove polyps. How do you prepare for the procedure? You need to clean out your colon before the procedure so the doctor can see all of your colon. You may start the cleaning process a day or two before the test. This depends on which \"colon prep\" your doctor recommends. To clean your colon, you stop eating solid foods and drink only clear liquids. You can have water, tea, coffee, clear juices, clear broths, flavored ice pops, and gelatin (such as Jell-O). Do not drink anything red or purple, such as grape juice or fruit punch. And do not eat red or purple foods, such as grape ice pops or cherry gelatin. The day or night before the procedure, you drink a large amount of a special liquid. This causes loose, frequent stools. You will go to the bathroom a lot. It is very important to drink all of the colon prep liquid. If you have problems drinking the liquid, call your doctor.   For there are small amounts of bright red blood on toilet paper or the surface of stools. This is because of enlarged veins near the rectum (hemorrhoids). A few changes in your diet and lifestyle may help you avoid ongoing constipation. Your doctor may also prescribe medicine to help loosen your stool. Some medicines can cause constipation. These include pain medicines and antidepressants. Tell your doctor about all the medicines you take. Your doctor may want to make a medicine change to ease your symptoms. Follow-up care is a key part of your treatment and safety. Be sure to make and go to all appointments, and call your doctor if you are having problems. It's also a good idea to know your test results and keep a list of the medicines you take. How can you care for yourself at home? · Drink plenty of fluids, enough so that your urine is light yellow or clear like water. If you have kidney, heart, or liver disease and have to limit fluids, talk with your doctor before you increase the amount of fluids you drink. · Include high-fiber foods in your diet each day. These include fruits, vegetables, beans, and whole grains. · Get at least 30 minutes of exercise on most days of the week. Walking is a good choice. You also may want to do other activities, such as running, swimming, cycling, or playing tennis or team sports. · Take a fiber supplement, such as Citrucel or Metamucil, every day. Read and follow all instructions on the label. · Schedule time each day for a bowel movement. A daily routine may help. Take your time having your bowel movement. · Support your feet with a small step stool when you sit on the toilet. This helps flex your hips and places your pelvis in a squatting position. · Your doctor may recommend an over-the-counter laxative to relieve your constipation. Examples are Milk of Magnesia and MiraLax. Read and follow all instructions on the label.  Do not use laxatives on a long-term

## 2018-08-01 RX ORDER — POLYETHYLENE GLYCOL 3350, SODIUM CHLORIDE, POTASSIUM CHLORIDE, SODIUM BICARBONATE, AND SODIUM SULFATE 240; 5.84; 2.98; 6.72; 22.72 G/4L; G/4L; G/4L; G/4L; G/4L
4000 POWDER, FOR SOLUTION ORAL ONCE
Qty: 4000 ML | Refills: 0 | Status: SHIPPED | OUTPATIENT
Start: 2018-08-01 | End: 2018-08-01

## 2018-08-21 DIAGNOSIS — F32.A DEPRESSIVE DISORDER: Primary | ICD-10-CM

## 2018-08-21 DIAGNOSIS — F42.9 OBSESSIVE-COMPULSIVE DISORDER, UNSPECIFIED TYPE: ICD-10-CM

## 2018-08-21 DIAGNOSIS — Z12.11 ENCOUNTER FOR SCREENING COLONOSCOPY: ICD-10-CM

## 2019-01-11 ENCOUNTER — HOSPITAL ENCOUNTER (EMERGENCY)
Age: 54
Discharge: ANOTHER HOSPITAL | End: 2019-01-11
Attending: EMERGENCY MEDICINE
Payer: COMMERCIAL

## 2019-01-11 ENCOUNTER — HOSPITAL ENCOUNTER (INPATIENT)
Age: 54
LOS: 6 days | Discharge: HOME | DRG: 753 | End: 2019-01-17
Attending: PSYCHIATRY & NEUROLOGY | Admitting: PSYCHIATRY & NEUROLOGY
Payer: COMMERCIAL

## 2019-01-11 VITALS
OXYGEN SATURATION: 99 % | WEIGHT: 155 LBS | RESPIRATION RATE: 14 BRPM | DIASTOLIC BLOOD PRESSURE: 69 MMHG | BODY MASS INDEX: 22.96 KG/M2 | HEIGHT: 69 IN | TEMPERATURE: 98.6 F | SYSTOLIC BLOOD PRESSURE: 113 MMHG | HEART RATE: 73 BPM

## 2019-01-11 DIAGNOSIS — R45.851 DEPRESSION WITH SUICIDAL IDEATION: Primary | ICD-10-CM

## 2019-01-11 DIAGNOSIS — F32.A DEPRESSION WITH SUICIDAL IDEATION: Primary | ICD-10-CM

## 2019-01-11 PROBLEM — F31.9 BIPOLAR DEPRESSION (HCC): Status: ACTIVE | Noted: 2019-01-11

## 2019-01-11 LAB
ABSOLUTE EOS #: 0.1 K/UL (ref 0–0.4)
ABSOLUTE IMMATURE GRANULOCYTE: ABNORMAL K/UL (ref 0–0.3)
ABSOLUTE LYMPH #: 0.9 K/UL (ref 1–4.8)
ABSOLUTE MONO #: 0.3 K/UL (ref 0–1)
AMPHETAMINE SCREEN URINE: NEGATIVE
ANION GAP SERPL CALCULATED.3IONS-SCNC: 7 MMOL/L (ref 9–17)
BARBITURATE SCREEN URINE: NEGATIVE
BASOPHILS # BLD: 0 % (ref 0–2)
BASOPHILS ABSOLUTE: 0 K/UL (ref 0–0.2)
BENZODIAZEPINE SCREEN, URINE: NEGATIVE
BUN BLDV-MCNC: 19 MG/DL (ref 6–20)
BUN/CREAT BLD: 15 (ref 9–20)
BUPRENORPHINE URINE: ABNORMAL
CALCIUM SERPL-MCNC: 9.7 MG/DL (ref 8.6–10.4)
CANNABINOID SCREEN URINE: NEGATIVE
CHLORIDE BLD-SCNC: 102 MMOL/L (ref 98–107)
CO2: 29 MMOL/L (ref 20–31)
COCAINE METABOLITE, URINE: NEGATIVE
CREAT SERPL-MCNC: 1.24 MG/DL (ref 0.7–1.2)
DIFFERENTIAL TYPE: YES
EOSINOPHILS RELATIVE PERCENT: 3 % (ref 0–5)
ETHANOL PERCENT: <0.01 %
ETHANOL: <10 MG/DL
GFR AFRICAN AMERICAN: >60 ML/MIN
GFR NON-AFRICAN AMERICAN: >60 ML/MIN
GFR SERPL CREATININE-BSD FRML MDRD: ABNORMAL ML/MIN/{1.73_M2}
GFR SERPL CREATININE-BSD FRML MDRD: ABNORMAL ML/MIN/{1.73_M2}
GLUCOSE BLD-MCNC: 110 MG/DL (ref 70–99)
HCT VFR BLD CALC: 42.5 % (ref 41–53)
HEMOGLOBIN: 14.4 G/DL (ref 13.5–17.5)
IMMATURE GRANULOCYTES: ABNORMAL %
LYMPHOCYTES # BLD: 19 % (ref 13–44)
MCH RBC QN AUTO: 29.9 PG (ref 26–34)
MCHC RBC AUTO-ENTMCNC: 34 G/DL (ref 31–37)
MCV RBC AUTO: 88 FL (ref 80–100)
MDMA URINE: ABNORMAL
METHADONE SCREEN, URINE: NEGATIVE
METHAMPHETAMINE, URINE: NEGATIVE
MONOCYTES # BLD: 6 % (ref 5–9)
NRBC AUTOMATED: ABNORMAL PER 100 WBC
OPIATES, URINE: NEGATIVE
OXYCODONE SCREEN URINE: NEGATIVE
PDW BLD-RTO: 12.9 % (ref 12.1–15.2)
PHENCYCLIDINE, URINE: NEGATIVE
PLATELET # BLD: 235 K/UL (ref 140–450)
PLATELET ESTIMATE: ABNORMAL
PMV BLD AUTO: ABNORMAL FL (ref 6–12)
POTASSIUM SERPL-SCNC: 4.7 MMOL/L (ref 3.7–5.3)
PROPOXYPHENE, URINE: NEGATIVE
RBC # BLD: 4.82 M/UL (ref 4.5–5.9)
RBC # BLD: ABNORMAL 10*6/UL
SEG NEUTROPHILS: 72 % (ref 39–75)
SEGMENTED NEUTROPHILS ABSOLUTE COUNT: 3.5 K/UL (ref 2.1–6.5)
SODIUM BLD-SCNC: 138 MMOL/L (ref 135–144)
TEST INFORMATION: ABNORMAL
TRICYCLIC ANTIDEPRESSANTS, UR: POSITIVE
WBC # BLD: 4.9 K/UL (ref 3.5–11)
WBC # BLD: ABNORMAL 10*3/UL

## 2019-01-11 PROCEDURE — 36415 COLL VENOUS BLD VENIPUNCTURE: CPT

## 2019-01-11 PROCEDURE — 99285 EMERGENCY DEPT VISIT HI MDM: CPT

## 2019-01-11 PROCEDURE — G0480 DRUG TEST DEF 1-7 CLASSES: HCPCS

## 2019-01-11 PROCEDURE — 1240000000 HC EMOTIONAL WELLNESS R&B

## 2019-01-11 PROCEDURE — 80048 BASIC METABOLIC PNL TOTAL CA: CPT

## 2019-01-11 PROCEDURE — 80306 DRUG TEST PRSMV INSTRMNT: CPT

## 2019-01-11 PROCEDURE — 85025 COMPLETE CBC W/AUTO DIFF WBC: CPT

## 2019-01-11 RX ORDER — NICOTINE 21 MG/24HR
1 PATCH, TRANSDERMAL 24 HOURS TRANSDERMAL DAILY
Status: DISCONTINUED | OUTPATIENT
Start: 2019-01-12 | End: 2019-01-12

## 2019-01-11 RX ORDER — MAGNESIUM HYDROXIDE/ALUMINUM HYDROXICE/SIMETHICONE 120; 1200; 1200 MG/30ML; MG/30ML; MG/30ML
30 SUSPENSION ORAL EVERY 6 HOURS PRN
Status: DISCONTINUED | OUTPATIENT
Start: 2019-01-11 | End: 2019-01-17 | Stop reason: HOSPADM

## 2019-01-11 RX ORDER — ACETAMINOPHEN 325 MG/1
650 TABLET ORAL EVERY 4 HOURS PRN
Status: DISCONTINUED | OUTPATIENT
Start: 2019-01-11 | End: 2019-01-17 | Stop reason: HOSPADM

## 2019-01-11 RX ORDER — TRAZODONE HYDROCHLORIDE 50 MG/1
50 TABLET ORAL NIGHTLY PRN
Status: DISCONTINUED | OUTPATIENT
Start: 2019-01-12 | End: 2019-01-17 | Stop reason: HOSPADM

## 2019-01-11 RX ORDER — HYDROXYZINE 50 MG/1
50 TABLET, FILM COATED ORAL 3 TIMES DAILY PRN
Status: DISCONTINUED | OUTPATIENT
Start: 2019-01-11 | End: 2019-01-17 | Stop reason: HOSPADM

## 2019-01-11 RX ORDER — BENZTROPINE MESYLATE 1 MG/ML
2 INJECTION INTRAMUSCULAR; INTRAVENOUS 2 TIMES DAILY PRN
Status: DISCONTINUED | OUTPATIENT
Start: 2019-01-11 | End: 2019-01-17 | Stop reason: HOSPADM

## 2019-01-11 ASSESSMENT — ENCOUNTER SYMPTOMS
SHORTNESS OF BREATH: 0
ABDOMINAL PAIN: 0
BACK PAIN: 0
COLOR CHANGE: 0
COUGH: 0
EYE REDNESS: 0
DIARRHEA: 0
NAUSEA: 0
TROUBLE SWALLOWING: 0
EYE PAIN: 0
SORE THROAT: 0
VOMITING: 0

## 2019-01-11 ASSESSMENT — PATIENT HEALTH QUESTIONNAIRE - PHQ9: SUM OF ALL RESPONSES TO PHQ QUESTIONS 1-9: 21

## 2019-01-12 LAB
ABSOLUTE EOS #: 0.2 K/UL (ref 0–0.4)
ABSOLUTE IMMATURE GRANULOCYTE: ABNORMAL K/UL (ref 0–0.3)
ABSOLUTE LYMPH #: 1.6 K/UL (ref 1–4.8)
ABSOLUTE MONO #: 0.4 K/UL (ref 0.1–1.3)
ALBUMIN SERPL-MCNC: 4 G/DL (ref 3.5–5.2)
ALBUMIN/GLOBULIN RATIO: ABNORMAL (ref 1–2.5)
ALP BLD-CCNC: 57 U/L (ref 40–129)
ALT SERPL-CCNC: 5 U/L (ref 5–41)
ANION GAP SERPL CALCULATED.3IONS-SCNC: 8 MMOL/L (ref 9–17)
AST SERPL-CCNC: 10 U/L
BASOPHILS # BLD: 1 % (ref 0–2)
BASOPHILS ABSOLUTE: 0 K/UL (ref 0–0.2)
BILIRUB SERPL-MCNC: 0.32 MG/DL (ref 0.3–1.2)
BUN BLDV-MCNC: 20 MG/DL (ref 6–20)
BUN/CREAT BLD: ABNORMAL (ref 9–20)
CALCIUM SERPL-MCNC: 9.4 MG/DL (ref 8.6–10.4)
CHLORIDE BLD-SCNC: 104 MMOL/L (ref 98–107)
CHOLESTEROL/HDL RATIO: 4.1
CHOLESTEROL: 208 MG/DL
CO2: 28 MMOL/L (ref 20–31)
CREAT SERPL-MCNC: 1 MG/DL (ref 0.7–1.2)
DIFFERENTIAL TYPE: ABNORMAL
EOSINOPHILS RELATIVE PERCENT: 5 % (ref 0–4)
GFR AFRICAN AMERICAN: >60 ML/MIN
GFR NON-AFRICAN AMERICAN: >60 ML/MIN
GFR SERPL CREATININE-BSD FRML MDRD: ABNORMAL ML/MIN/{1.73_M2}
GFR SERPL CREATININE-BSD FRML MDRD: ABNORMAL ML/MIN/{1.73_M2}
GLUCOSE BLD-MCNC: 95 MG/DL (ref 70–99)
HCT VFR BLD CALC: 42.1 % (ref 41–53)
HDLC SERPL-MCNC: 51 MG/DL
HEMOGLOBIN: 14.2 G/DL (ref 13.5–17.5)
IMMATURE GRANULOCYTES: ABNORMAL %
LDL CHOLESTEROL: 139 MG/DL (ref 0–130)
LYMPHOCYTES # BLD: 32 % (ref 24–44)
MCH RBC QN AUTO: 29.7 PG (ref 26–34)
MCHC RBC AUTO-ENTMCNC: 33.8 G/DL (ref 31–37)
MCV RBC AUTO: 87.9 FL (ref 80–100)
MONOCYTES # BLD: 9 % (ref 1–7)
NRBC AUTOMATED: ABNORMAL PER 100 WBC
PDW BLD-RTO: 12.6 % (ref 11.5–14.9)
PLATELET # BLD: 214 K/UL (ref 150–450)
PLATELET ESTIMATE: ABNORMAL
PMV BLD AUTO: 7.2 FL (ref 6–12)
POTASSIUM SERPL-SCNC: 4.5 MMOL/L (ref 3.7–5.3)
RBC # BLD: 4.79 M/UL (ref 4.5–5.9)
RBC # BLD: ABNORMAL 10*6/UL
SEG NEUTROPHILS: 53 % (ref 36–66)
SEGMENTED NEUTROPHILS ABSOLUTE COUNT: 2.7 K/UL (ref 1.3–9.1)
SODIUM BLD-SCNC: 140 MMOL/L (ref 135–144)
THYROXINE, FREE: 1.15 NG/DL (ref 0.93–1.7)
TOTAL PROTEIN: 6.4 G/DL (ref 6.4–8.3)
TRIGL SERPL-MCNC: 90 MG/DL
TSH SERPL DL<=0.05 MIU/L-ACNC: 4.61 MIU/L (ref 0.3–5)
VLDLC SERPL CALC-MCNC: ABNORMAL MG/DL (ref 1–30)
WBC # BLD: 4.9 K/UL (ref 3.5–11)
WBC # BLD: ABNORMAL 10*3/UL

## 2019-01-12 PROCEDURE — 84443 ASSAY THYROID STIM HORMONE: CPT

## 2019-01-12 PROCEDURE — 83036 HEMOGLOBIN GLYCOSYLATED A1C: CPT

## 2019-01-12 PROCEDURE — 36415 COLL VENOUS BLD VENIPUNCTURE: CPT

## 2019-01-12 PROCEDURE — 84439 ASSAY OF FREE THYROXINE: CPT

## 2019-01-12 PROCEDURE — 85025 COMPLETE CBC W/AUTO DIFF WBC: CPT

## 2019-01-12 PROCEDURE — 90792 PSYCH DIAG EVAL W/MED SRVCS: CPT | Performed by: NURSE PRACTITIONER

## 2019-01-12 PROCEDURE — 80061 LIPID PANEL: CPT

## 2019-01-12 PROCEDURE — 1240000000 HC EMOTIONAL WELLNESS R&B

## 2019-01-12 PROCEDURE — 80053 COMPREHEN METABOLIC PANEL: CPT

## 2019-01-12 PROCEDURE — 6370000000 HC RX 637 (ALT 250 FOR IP): Performed by: NURSE PRACTITIONER

## 2019-01-12 RX ORDER — TAMSULOSIN HYDROCHLORIDE 0.4 MG/1
0.4 CAPSULE ORAL DAILY
Status: DISCONTINUED | OUTPATIENT
Start: 2019-01-12 | End: 2019-01-17 | Stop reason: HOSPADM

## 2019-01-12 RX ORDER — LAMOTRIGINE 100 MG/1
200 TABLET ORAL DAILY
Status: DISCONTINUED | OUTPATIENT
Start: 2019-01-12 | End: 2019-01-17 | Stop reason: HOSPADM

## 2019-01-12 RX ORDER — MELOXICAM 7.5 MG/1
7.5 TABLET ORAL DAILY
Status: DISCONTINUED | OUTPATIENT
Start: 2019-01-12 | End: 2019-01-17 | Stop reason: HOSPADM

## 2019-01-12 RX ADMIN — MELOXICAM 7.5 MG: 7.5 TABLET ORAL at 13:48

## 2019-01-12 RX ADMIN — LAMOTRIGINE 200 MG: 100 TABLET ORAL at 13:48

## 2019-01-12 ASSESSMENT — SLEEP AND FATIGUE QUESTIONNAIRES
DIFFICULTY FALLING ASLEEP: YES
DO YOU HAVE DIFFICULTY SLEEPING: NO
DIFFICULTY STAYING ASLEEP: NO
RESTFUL SLEEP: YES
AVERAGE NUMBER OF SLEEP HOURS: 10
DO YOU USE A SLEEP AID: YES
DIFFICULTY ARISING: NO
SLEEP PATTERN: DIFFICULTY FALLING ASLEEP

## 2019-01-12 ASSESSMENT — LIFESTYLE VARIABLES
HISTORY_ALCOHOL_USE: NO
HISTORY_ALCOHOL_USE: NO

## 2019-01-12 ASSESSMENT — PATIENT HEALTH QUESTIONNAIRE - PHQ9: SUM OF ALL RESPONSES TO PHQ QUESTIONS 1-9: 15

## 2019-01-12 ASSESSMENT — PAIN SCALES - GENERAL: PAINLEVEL_OUTOF10: 0

## 2019-01-13 PROBLEM — F31.4 BIPOLAR 1 DISORDER, DEPRESSED, SEVERE (HCC): Chronic | Status: ACTIVE | Noted: 2019-01-11

## 2019-01-13 LAB
ESTIMATED AVERAGE GLUCOSE: 94 MG/DL
HBA1C MFR BLD: 4.9 % (ref 4–6)

## 2019-01-13 PROCEDURE — 1240000000 HC EMOTIONAL WELLNESS R&B

## 2019-01-13 PROCEDURE — 99232 SBSQ HOSP IP/OBS MODERATE 35: CPT | Performed by: PSYCHIATRY & NEUROLOGY

## 2019-01-13 PROCEDURE — 6370000000 HC RX 637 (ALT 250 FOR IP): Performed by: PSYCHIATRY & NEUROLOGY

## 2019-01-13 PROCEDURE — 6370000000 HC RX 637 (ALT 250 FOR IP): Performed by: NURSE PRACTITIONER

## 2019-01-13 RX ORDER — BENZTROPINE MESYLATE 0.5 MG/1
0.5 TABLET ORAL 2 TIMES DAILY
Status: DISCONTINUED | OUTPATIENT
Start: 2019-01-13 | End: 2019-01-14

## 2019-01-13 RX ADMIN — BENZTROPINE MESYLATE 0.5 MG: 0.5 TABLET ORAL at 20:48

## 2019-01-13 RX ADMIN — TRAZODONE HYDROCHLORIDE 50 MG: 50 TABLET ORAL at 20:48

## 2019-01-13 RX ADMIN — LAMOTRIGINE 200 MG: 100 TABLET ORAL at 09:06

## 2019-01-13 RX ADMIN — BENZTROPINE MESYLATE 0.5 MG: 0.5 TABLET ORAL at 17:58

## 2019-01-13 RX ADMIN — HYDROXYZINE HYDROCHLORIDE 50 MG: 50 TABLET, FILM COATED ORAL at 19:02

## 2019-01-13 RX ADMIN — MELOXICAM 7.5 MG: 7.5 TABLET ORAL at 09:06

## 2019-01-13 ASSESSMENT — PAIN SCALES - GENERAL: PAINLEVEL_OUTOF10: 0

## 2019-01-14 PROCEDURE — 6370000000 HC RX 637 (ALT 250 FOR IP): Performed by: NURSE PRACTITIONER

## 2019-01-14 PROCEDURE — 99232 SBSQ HOSP IP/OBS MODERATE 35: CPT | Performed by: PSYCHIATRY & NEUROLOGY

## 2019-01-14 PROCEDURE — 6370000000 HC RX 637 (ALT 250 FOR IP): Performed by: PSYCHIATRY & NEUROLOGY

## 2019-01-14 PROCEDURE — 1240000000 HC EMOTIONAL WELLNESS R&B

## 2019-01-14 RX ORDER — BENZTROPINE MESYLATE 1 MG/1
1 TABLET ORAL 2 TIMES DAILY
Status: DISCONTINUED | OUTPATIENT
Start: 2019-01-14 | End: 2019-01-17 | Stop reason: HOSPADM

## 2019-01-14 RX ADMIN — LAMOTRIGINE 200 MG: 100 TABLET ORAL at 08:41

## 2019-01-14 RX ADMIN — MELOXICAM 7.5 MG: 7.5 TABLET ORAL at 08:41

## 2019-01-14 RX ADMIN — BENZTROPINE MESYLATE 0.5 MG: 0.5 TABLET ORAL at 08:41

## 2019-01-14 RX ADMIN — TRAZODONE HYDROCHLORIDE 50 MG: 50 TABLET ORAL at 20:51

## 2019-01-14 RX ADMIN — HYDROXYZINE HYDROCHLORIDE 50 MG: 50 TABLET, FILM COATED ORAL at 08:41

## 2019-01-14 RX ADMIN — HYDROXYZINE HYDROCHLORIDE 50 MG: 50 TABLET, FILM COATED ORAL at 20:51

## 2019-01-14 RX ADMIN — BENZTROPINE MESYLATE 1 MG: 1 TABLET ORAL at 20:51

## 2019-01-14 ASSESSMENT — PAIN SCALES - GENERAL: PAINLEVEL_OUTOF10: 0

## 2019-01-15 PROCEDURE — 1240000000 HC EMOTIONAL WELLNESS R&B

## 2019-01-15 PROCEDURE — 6370000000 HC RX 637 (ALT 250 FOR IP): Performed by: PSYCHIATRY & NEUROLOGY

## 2019-01-15 PROCEDURE — 99232 SBSQ HOSP IP/OBS MODERATE 35: CPT | Performed by: NURSE PRACTITIONER

## 2019-01-15 PROCEDURE — 6370000000 HC RX 637 (ALT 250 FOR IP): Performed by: NURSE PRACTITIONER

## 2019-01-15 RX ADMIN — MELOXICAM 7.5 MG: 7.5 TABLET ORAL at 08:40

## 2019-01-15 RX ADMIN — BENZTROPINE MESYLATE 1 MG: 1 TABLET ORAL at 08:41

## 2019-01-15 RX ADMIN — LAMOTRIGINE 200 MG: 100 TABLET ORAL at 08:40

## 2019-01-15 RX ADMIN — BENZTROPINE MESYLATE 1 MG: 1 TABLET ORAL at 21:09

## 2019-01-15 ASSESSMENT — PAIN SCALES - GENERAL: PAINLEVEL_OUTOF10: 4

## 2019-01-16 PROCEDURE — 99232 SBSQ HOSP IP/OBS MODERATE 35: CPT | Performed by: NURSE PRACTITIONER

## 2019-01-16 PROCEDURE — 1240000000 HC EMOTIONAL WELLNESS R&B

## 2019-01-16 PROCEDURE — 6370000000 HC RX 637 (ALT 250 FOR IP): Performed by: NURSE PRACTITIONER

## 2019-01-16 PROCEDURE — 6370000000 HC RX 637 (ALT 250 FOR IP): Performed by: PSYCHIATRY & NEUROLOGY

## 2019-01-16 RX ADMIN — BENZTROPINE MESYLATE 1 MG: 1 TABLET ORAL at 20:53

## 2019-01-16 RX ADMIN — TAMSULOSIN HYDROCHLORIDE 0.4 MG: 0.4 CAPSULE ORAL at 09:12

## 2019-01-16 RX ADMIN — BENZTROPINE MESYLATE 1 MG: 1 TABLET ORAL at 09:12

## 2019-01-16 RX ADMIN — MELOXICAM 7.5 MG: 7.5 TABLET ORAL at 09:12

## 2019-01-16 RX ADMIN — LAMOTRIGINE 200 MG: 100 TABLET ORAL at 09:12

## 2019-01-16 RX ADMIN — TRAZODONE HYDROCHLORIDE 50 MG: 50 TABLET ORAL at 20:53

## 2019-01-16 ASSESSMENT — PAIN SCALES - GENERAL: PAINLEVEL_OUTOF10: 3

## 2019-01-17 VITALS
RESPIRATION RATE: 14 BRPM | BODY MASS INDEX: 22.96 KG/M2 | HEART RATE: 88 BPM | OXYGEN SATURATION: 97 % | SYSTOLIC BLOOD PRESSURE: 113 MMHG | WEIGHT: 155 LBS | TEMPERATURE: 97.9 F | DIASTOLIC BLOOD PRESSURE: 67 MMHG | HEIGHT: 69 IN

## 2019-01-17 PROBLEM — F31.4 BIPOLAR 1 DISORDER, DEPRESSED, SEVERE (HCC): Chronic | Status: RESOLVED | Noted: 2019-01-11 | Resolved: 2019-01-17

## 2019-01-17 LAB — GLUCOSE BLD-MCNC: 88 MG/DL (ref 75–110)

## 2019-01-17 PROCEDURE — 6370000000 HC RX 637 (ALT 250 FOR IP): Performed by: PSYCHIATRY & NEUROLOGY

## 2019-01-17 PROCEDURE — 82947 ASSAY GLUCOSE BLOOD QUANT: CPT

## 2019-01-17 PROCEDURE — 99238 HOSP IP/OBS DSCHRG MGMT 30/<: CPT | Performed by: NURSE PRACTITIONER

## 2019-01-17 PROCEDURE — 5130000000 HC BRIDGE APPOINTMENT: Performed by: COUNSELOR

## 2019-01-17 PROCEDURE — 6370000000 HC RX 637 (ALT 250 FOR IP): Performed by: NURSE PRACTITIONER

## 2019-01-17 RX ORDER — HYDROXYZINE 50 MG/1
50 TABLET, FILM COATED ORAL 3 TIMES DAILY PRN
Qty: 42 TABLET | Refills: 0 | Status: SHIPPED | OUTPATIENT
Start: 2019-01-17 | End: 2019-01-27

## 2019-01-17 RX ORDER — TRAZODONE HYDROCHLORIDE 50 MG/1
50 TABLET ORAL NIGHTLY PRN
Qty: 14 TABLET | Refills: 0 | Status: SHIPPED | OUTPATIENT
Start: 2019-01-17

## 2019-01-17 RX ORDER — BENZTROPINE MESYLATE 1 MG/1
1 TABLET ORAL 2 TIMES DAILY
Qty: 28 TABLET | Refills: 0 | Status: SHIPPED | OUTPATIENT
Start: 2019-01-17

## 2019-01-17 RX ORDER — LAMOTRIGINE 200 MG/1
200 TABLET ORAL DAILY
Qty: 14 TABLET | Refills: 0 | Status: SHIPPED | OUTPATIENT
Start: 2019-01-18

## 2019-01-17 RX ADMIN — LAMOTRIGINE 200 MG: 100 TABLET ORAL at 08:05

## 2019-01-17 RX ADMIN — BENZTROPINE MESYLATE 1 MG: 1 TABLET ORAL at 08:05

## 2019-01-17 RX ADMIN — HYDROXYZINE HYDROCHLORIDE 50 MG: 50 TABLET, FILM COATED ORAL at 08:06

## 2019-01-17 RX ADMIN — MELOXICAM 7.5 MG: 7.5 TABLET ORAL at 08:05

## 2019-01-17 ASSESSMENT — PAIN SCALES - GENERAL: PAINLEVEL_OUTOF10: 0

## 2019-01-18 ENCOUNTER — TELEPHONE (OUTPATIENT)
Dept: FAMILY MEDICINE CLINIC | Age: 54
End: 2019-01-18

## 2019-01-22 ENCOUNTER — OFFICE VISIT (OUTPATIENT)
Dept: FAMILY MEDICINE CLINIC | Age: 54
End: 2019-01-22
Payer: COMMERCIAL

## 2019-01-22 VITALS
WEIGHT: 159 LBS | HEIGHT: 69 IN | SYSTOLIC BLOOD PRESSURE: 102 MMHG | DIASTOLIC BLOOD PRESSURE: 60 MMHG | HEART RATE: 97 BPM | BODY MASS INDEX: 23.55 KG/M2

## 2019-01-22 DIAGNOSIS — Z12.11 SCREENING FOR COLON CANCER: ICD-10-CM

## 2019-01-22 DIAGNOSIS — F33.9 RECURRENT MAJOR DEPRESSIVE DISORDER, REMISSION STATUS UNSPECIFIED (HCC): Primary | ICD-10-CM

## 2019-01-22 PROCEDURE — 1111F DSCHRG MED/CURRENT MED MERGE: CPT | Performed by: INTERNAL MEDICINE

## 2019-01-22 PROCEDURE — 99214 OFFICE O/P EST MOD 30 MIN: CPT | Performed by: INTERNAL MEDICINE

## 2019-02-22 DIAGNOSIS — Z12.11 SCREENING FOR COLON CANCER: ICD-10-CM

## 2019-02-25 ENCOUNTER — HOSPITAL ENCOUNTER (OUTPATIENT)
Dept: MRI IMAGING | Age: 54
Discharge: HOME | End: 2019-02-27
Payer: COMMERCIAL

## 2019-02-25 DIAGNOSIS — G20 PARKINSONISM, UNSPECIFIED PARKINSONISM TYPE (HCC): ICD-10-CM

## 2019-02-25 DIAGNOSIS — R41.89 PSEUDODEMENTIA: ICD-10-CM

## 2019-02-25 DIAGNOSIS — R41.82 ALTERED MENTAL STATUS, UNSPECIFIED ALTERED MENTAL STATUS TYPE: ICD-10-CM

## 2019-02-25 DIAGNOSIS — F32.A DEPRESSION, UNSPECIFIED DEPRESSION TYPE: ICD-10-CM

## 2019-02-25 PROCEDURE — 70551 MRI BRAIN STEM W/O DYE: CPT

## 2019-03-13 ENCOUNTER — HOSPITAL ENCOUNTER (EMERGENCY)
Age: 54
Discharge: HOME | End: 2019-03-13
Attending: FAMILY MEDICINE
Payer: COMMERCIAL

## 2019-03-13 ENCOUNTER — APPOINTMENT (OUTPATIENT)
Dept: GENERAL RADIOLOGY | Age: 54
End: 2019-03-13
Payer: COMMERCIAL

## 2019-03-13 VITALS
SYSTOLIC BLOOD PRESSURE: 108 MMHG | HEIGHT: 69 IN | DIASTOLIC BLOOD PRESSURE: 81 MMHG | WEIGHT: 155 LBS | OXYGEN SATURATION: 100 % | HEART RATE: 95 BPM | RESPIRATION RATE: 15 BRPM | TEMPERATURE: 97.7 F | BODY MASS INDEX: 22.96 KG/M2

## 2019-03-13 DIAGNOSIS — R00.0 TACHYCARDIA: Primary | ICD-10-CM

## 2019-03-13 LAB
ABSOLUTE EOS #: 0.1 K/UL (ref 0–0.4)
ABSOLUTE IMMATURE GRANULOCYTE: NORMAL K/UL (ref 0–0.3)
ABSOLUTE LYMPH #: 1.3 K/UL (ref 1–4.8)
ABSOLUTE MONO #: 0.4 K/UL (ref 0–1)
ALBUMIN SERPL-MCNC: 4.9 G/DL (ref 3.5–5.2)
ALBUMIN/GLOBULIN RATIO: ABNORMAL (ref 1–2.5)
ALP BLD-CCNC: 71 U/L (ref 40–129)
ALT SERPL-CCNC: 9 U/L (ref 5–41)
AMPHETAMINE SCREEN URINE: NEGATIVE
ANION GAP SERPL CALCULATED.3IONS-SCNC: 13 MMOL/L (ref 9–17)
AST SERPL-CCNC: 13 U/L
BARBITURATE SCREEN URINE: NEGATIVE
BASOPHILS # BLD: 0 % (ref 0–2)
BASOPHILS ABSOLUTE: 0 K/UL (ref 0–0.2)
BENZODIAZEPINE SCREEN, URINE: NEGATIVE
BILIRUB SERPL-MCNC: 0.67 MG/DL (ref 0.3–1.2)
BILIRUBIN URINE: NEGATIVE
BUN BLDV-MCNC: 13 MG/DL (ref 6–20)
BUN/CREAT BLD: 12 (ref 9–20)
BUPRENORPHINE URINE: NORMAL
CALCIUM SERPL-MCNC: 9.8 MG/DL (ref 8.6–10.4)
CANNABINOID SCREEN URINE: NEGATIVE
CHLORIDE BLD-SCNC: 101 MMOL/L (ref 98–107)
CO2: 24 MMOL/L (ref 20–31)
COCAINE METABOLITE, URINE: NEGATIVE
COLOR: YELLOW
COMMENT UA: NORMAL
CREAT SERPL-MCNC: 1.1 MG/DL (ref 0.7–1.2)
D-DIMER QUANTITATIVE: 0.19 MG/L FEU (ref 0–0.5)
DIFFERENTIAL TYPE: YES
DIRECT EXAM: NORMAL
DIRECT EXAM: NORMAL
EOSINOPHILS RELATIVE PERCENT: 2 % (ref 0–5)
GFR AFRICAN AMERICAN: >60 ML/MIN
GFR NON-AFRICAN AMERICAN: >60 ML/MIN
GFR SERPL CREATININE-BSD FRML MDRD: ABNORMAL ML/MIN/{1.73_M2}
GFR SERPL CREATININE-BSD FRML MDRD: ABNORMAL ML/MIN/{1.73_M2}
GLUCOSE BLD-MCNC: 121 MG/DL (ref 70–99)
GLUCOSE URINE: NEGATIVE
HCT VFR BLD CALC: 45.1 % (ref 41–53)
HEMOGLOBIN: 15.1 G/DL (ref 13.5–17.5)
IMMATURE GRANULOCYTES: NORMAL %
INR BLD: 1.1
KETONES, URINE: NEGATIVE
LEUKOCYTE ESTERASE, URINE: NEGATIVE
LYMPHOCYTES # BLD: 24 % (ref 13–44)
Lab: NORMAL
MCH RBC QN AUTO: 29.1 PG (ref 26–34)
MCHC RBC AUTO-ENTMCNC: 33.4 G/DL (ref 31–37)
MCV RBC AUTO: 87.2 FL (ref 80–100)
MDMA URINE: NORMAL
METHADONE SCREEN, URINE: NEGATIVE
METHAMPHETAMINE, URINE: NEGATIVE
MONOCYTES # BLD: 7 % (ref 5–9)
NITRITE, URINE: NEGATIVE
NRBC AUTOMATED: NORMAL PER 100 WBC
OPIATES, URINE: NEGATIVE
OXYCODONE SCREEN URINE: NEGATIVE
PARTIAL THROMBOPLASTIN TIME: 27.1 SEC (ref 21–33)
PDW BLD-RTO: 12.6 % (ref 12.1–15.2)
PH UA: 8 (ref 5–8)
PHENCYCLIDINE, URINE: NEGATIVE
PLATELET # BLD: 261 K/UL (ref 140–450)
PLATELET ESTIMATE: NORMAL
PMV BLD AUTO: NORMAL FL (ref 6–12)
POTASSIUM SERPL-SCNC: 3.9 MMOL/L (ref 3.7–5.3)
PROPOXYPHENE, URINE: NEGATIVE
PROTEIN UA: NEGATIVE
PROTHROMBIN TIME: 10.3 SEC (ref 9–11.6)
RBC # BLD: 5.18 M/UL (ref 4.5–5.9)
RBC # BLD: NORMAL 10*6/UL
SEG NEUTROPHILS: 67 % (ref 39–75)
SEGMENTED NEUTROPHILS ABSOLUTE COUNT: 3.7 K/UL (ref 2.1–6.5)
SODIUM BLD-SCNC: 138 MMOL/L (ref 135–144)
SPECIFIC GRAVITY UA: 1.01 (ref 1–1.03)
SPECIMEN DESCRIPTION: NORMAL
TEST INFORMATION: NORMAL
TOTAL PROTEIN: 7.5 G/DL (ref 6.4–8.3)
TRICYCLIC ANTIDEPRESSANTS, UR: NEGATIVE
TROPONIN INTERP: NORMAL
TROPONIN T: <0.03 NG/ML
TROPONIN, HIGH SENSITIVITY: NORMAL NG/L (ref 0–22)
TURBIDITY: CLEAR
URINE HGB: NEGATIVE
UROBILINOGEN, URINE: NORMAL
WBC # BLD: 5.5 K/UL (ref 3.5–11)
WBC # BLD: NORMAL 10*3/UL

## 2019-03-13 PROCEDURE — 71045 X-RAY EXAM CHEST 1 VIEW: CPT

## 2019-03-13 PROCEDURE — 81003 URINALYSIS AUTO W/O SCOPE: CPT

## 2019-03-13 PROCEDURE — 99285 EMERGENCY DEPT VISIT HI MDM: CPT

## 2019-03-13 PROCEDURE — 85379 FIBRIN DEGRADATION QUANT: CPT

## 2019-03-13 PROCEDURE — 87804 INFLUENZA ASSAY W/OPTIC: CPT

## 2019-03-13 PROCEDURE — 85730 THROMBOPLASTIN TIME PARTIAL: CPT

## 2019-03-13 PROCEDURE — 84484 ASSAY OF TROPONIN QUANT: CPT

## 2019-03-13 PROCEDURE — 93005 ELECTROCARDIOGRAM TRACING: CPT

## 2019-03-13 PROCEDURE — 2580000003 HC RX 258: Performed by: FAMILY MEDICINE

## 2019-03-13 PROCEDURE — 85025 COMPLETE CBC W/AUTO DIFF WBC: CPT

## 2019-03-13 PROCEDURE — 6370000000 HC RX 637 (ALT 250 FOR IP): Performed by: FAMILY MEDICINE

## 2019-03-13 PROCEDURE — 85610 PROTHROMBIN TIME: CPT

## 2019-03-13 PROCEDURE — 80306 DRUG TEST PRSMV INSTRMNT: CPT

## 2019-03-13 PROCEDURE — 80053 COMPREHEN METABOLIC PANEL: CPT

## 2019-03-13 RX ORDER — DILTIAZEM HYDROCHLORIDE 5 MG/ML
10 INJECTION INTRAVENOUS ONCE
Status: DISCONTINUED | OUTPATIENT
Start: 2019-03-13 | End: 2019-03-13

## 2019-03-13 RX ORDER — 0.9 % SODIUM CHLORIDE 0.9 %
1000 INTRAVENOUS SOLUTION INTRAVENOUS ONCE
Status: COMPLETED | OUTPATIENT
Start: 2019-03-13 | End: 2019-03-13

## 2019-03-13 RX ORDER — HYDROXYZINE 50 MG/1
50 TABLET, FILM COATED ORAL 2 TIMES DAILY
Refills: 1 | COMMUNITY
Start: 2019-02-07

## 2019-03-13 RX ORDER — ADENOSINE 3 MG/ML
6 INJECTION, SOLUTION INTRAVENOUS ONCE
Status: DISCONTINUED | OUTPATIENT
Start: 2019-03-13 | End: 2019-03-13

## 2019-03-13 RX ADMIN — SODIUM CHLORIDE 1000 ML: 9 INJECTION, SOLUTION INTRAVENOUS at 16:22

## 2019-03-13 RX ADMIN — DILTIAZEM HYDROCHLORIDE 30 MG: 30 TABLET, FILM COATED ORAL at 18:03

## 2019-03-13 ASSESSMENT — ENCOUNTER SYMPTOMS
SHORTNESS OF BREATH: 0
ABDOMINAL PAIN: 0
COUGH: 0
ABDOMINAL DISTENTION: 0
WHEEZING: 0
SINUS PAIN: 0
SINUS PRESSURE: 0
STRIDOR: 0
CHEST TIGHTNESS: 0
BACK PAIN: 0
COLOR CHANGE: 0

## 2019-03-14 ENCOUNTER — TELEPHONE (OUTPATIENT)
Dept: FAMILY MEDICINE CLINIC | Age: 54
End: 2019-03-14

## 2019-03-14 LAB
EKG ATRIAL RATE: 44 BPM
EKG ATRIAL RATE: 82 BPM
EKG P AXIS: 68 DEGREES
EKG P-R INTERVAL: 192 MS
EKG Q-T INTERVAL: 306 MS
EKG Q-T INTERVAL: 380 MS
EKG QRS DURATION: 88 MS
EKG QRS DURATION: 92 MS
EKG QTC CALCULATION (BAZETT): 443 MS
EKG QTC CALCULATION (BAZETT): 476 MS
EKG R AXIS: 64 DEGREES
EKG R AXIS: 66 DEGREES
EKG T AXIS: 15 DEGREES
EKG T AXIS: 58 DEGREES
EKG VENTRICULAR RATE: 146 BPM
EKG VENTRICULAR RATE: 82 BPM

## 2019-03-29 ENCOUNTER — OFFICE VISIT (OUTPATIENT)
Dept: CARDIOLOGY CLINIC | Age: 54
End: 2019-03-29
Payer: COMMERCIAL

## 2019-03-29 VITALS
WEIGHT: 158 LBS | HEART RATE: 96 BPM | OXYGEN SATURATION: 97 % | SYSTOLIC BLOOD PRESSURE: 102 MMHG | BODY MASS INDEX: 23.33 KG/M2 | DIASTOLIC BLOOD PRESSURE: 70 MMHG

## 2019-03-29 DIAGNOSIS — R00.0 TACHYCARDIA: Primary | ICD-10-CM

## 2019-03-29 PROCEDURE — 99214 OFFICE O/P EST MOD 30 MIN: CPT | Performed by: INTERNAL MEDICINE

## 2019-03-29 PROCEDURE — 1036F TOBACCO NON-USER: CPT | Performed by: INTERNAL MEDICINE

## 2019-03-29 PROCEDURE — 3017F COLORECTAL CA SCREEN DOC REV: CPT | Performed by: INTERNAL MEDICINE

## 2019-03-29 PROCEDURE — G8484 FLU IMMUNIZE NO ADMIN: HCPCS | Performed by: INTERNAL MEDICINE

## 2019-03-29 PROCEDURE — G8427 DOCREV CUR MEDS BY ELIG CLIN: HCPCS | Performed by: INTERNAL MEDICINE

## 2019-03-29 PROCEDURE — G8420 CALC BMI NORM PARAMETERS: HCPCS | Performed by: INTERNAL MEDICINE

## 2019-03-29 NOTE — LETTER
Farzana Chapa M.D. [address] [place] [address] [facility]  [phone]        2019        Alison Banks MD  [address] [facility], [address] [facility]    RE:   Rhona Chu  :  1965    Dear Dr. Jennifer Gilbert:    CHIEF COMPLAINT:  1. SVT versus sinus tachycardia. 2.  Severe depression. 3.  Severe anxiety. 4.  History of schizophrenia and bipolar disorder. HISTORY OF PRESENT ILLNESS:  Mr. Renetta Tolliver is a pleasant 51-year-old gentleman, who has never had a cardiac problem in the past.  Never had a myocardial infarction, never had any cardiac testing. He has severe psychiatric issues with depression, schizophrenia, and bipolar. He has been hospitalized for depression and anxiety. He was most recently hospitalized on 2019. Those records are not available to me. He developed tachycardia and came to the emergency room on 2019. In the emergency room, he stated he had a heart rate that started approximately 2:30 in the afternoon. He arrived in ER at 4 oclock in the afternoon. His blood pressure was 108/81 with a heart rate initially of 140; although, it decreased quickly to 110 and then 95. He had no chest pain or chest discomfort. No unusual shortness of breath. I initially felt his EKG might be atrial flutter with 2:1 conduction. However, in speaking with Dr. Lavinia May, emergency room physician, it sounds like it decreased gradually as we would expect from sinus tachycardia. His blood work was negative for an acute MI and his EKG showed no acute changes. Again, he denied any chest pain or shortness of breath with his tachycardia. We placed him on Cardizem 30 mg b.i.d. and I arranged to see him in consult. He is extremely nervous and somewhat agitated as he comes here today. He paces in the room and has difficulty with answering questions.   Denies pain or discomfort. He knows his medications. He states that he had one more episode of fast heart beat approximately 3 or 4 days after his ER visit. He is concerned about being on the Diltiazem and does not want to \"become hooked\" on any mediations. He is also afraid he is going to have to Darrow it for ever. \"    CARDIAC RISK FACTORS:  Hypertension:  Negative. Other Family Members:  Positive. Peripheral Vascular Disease:  Negative. Smoking:  Negative. Hyperlipidemia:  Negative. Diabetes:  Negative. MEDICATIONS:  At home, he is currently on Cogentin 1 mg b.i.d., Atarax 50 mg b.i.d., Lamictal 200 mg daily, Desyrel 50 mg p.r.n. for sleep, Invega injections. PAST MEDICAL HISTORY:  Depression and bipolar disorder. He had toe surgery many years ago. FAMILY HISTORY:  There is a history of coronary artery disease in his family. SOCIAL HISTORY:  He is 48years old. He has no children. Lives alone and I do not believe that he is . Lives in [facility]. Does not smoke. Does not drink alcohol. REVIEW OF SYSTEMS:  We asked about constitutional, eyes, ears, nose and throat, cardiovascular, respiratory, GI, , musculoskeletal, integumentary, neurologic, psychiatric, endocrine, hematologic, and allergic/immunologic and was negative. PHYSICAL EXAMINATION:   VITAL SIGNS:  His blood pressure was 102/60 with a heart rate of 80 and regular. Respiratory rate 18. He weighed 158 pounds. O2 saturation was 97%. GENERAL:  He is a very anxious 80-year-old gentleman, who appeared to have tardive mouth movements. He answered very slowly in one word answers. He avoided eye contact and shifted continuously in his chair. HEENT:  The pupils are equally round and intact. Mucous membranes were dry. NECK:  No JVD. Good carotid pulses. No carotid bruits. No lymphadenopathy or thyromegaly. CARDIOVASCULAR EXAM:  S1 and S2 were normal.  No S3 or S4.   Soft systolic blowing type murmur. No diastolic murmur. PMI was normal.  No lift, thrust, or pericardial friction rub. LUNGS:  Quite clear to auscultation and percussion. ABDOMEN:  Soft and nontender. Good bowel sounds. EXTREMITIES:  Good femoral pulses. Good pedal pulses. No pedal edema. Skin was warm and dry. No calf tenderness. Nail beds pink. Good cap refill. LABORATORY DATA:  From the emergency room, his sodium is 138, potassium 3.9, BUN 13, creatinine 1.10, GFR greater than 60. Calcium 9.8. Troponin less than 0.03. ALT was 9, ALT was 13. White count 5.5, hemoglobin 15.1 with a platelet count 984,398. EKG initially showed tachycardia at 147 beats per minute. I could not distinguish the P waves; although, they may have been buried in the QRS complex. Repeat EKG showed sinus rhythm at 96 beats per minute and was normal.    Bedside echocardiogram appeared to have normal EF of 50% to 55%, the right-sided chamber seemed generous; although, I had very limited visibility. IMPRESSION:  1. Tachycardia at 147 beats per minute when he came into the emergency room on 03/13, which initially appeared to be atrial flutter with 2:1 conduction; although when it slowed, it slowed gradually consistent with sinus tachycardia. 2.  Severe bipolar, schizophrenia, anxiety, severe depression. 3.  Appears to have tardive dyskinesia movements of his mouth. PLAN:  1. Stop Cardizem. 2.  Will see as needed. DISCUSSION:  Mr. Maida Galeazzi was very anxious when he saw me in my office. I got a very limited history, but he denied any chest pain or unusual shortness of breath. I asked about change in activity, and he was not sure what that meant. I asked if he was fairly active at home, and he stated that he walks occasionally in his house. I did not get a sense that there has been a change in his activity level. He also denied any chest pain or chest discomfort. His tachycardia is difficult to discern. On admission, his heart rate was 147 and again, I would initially feel that this was an atrial flutter with 2:1 conduction or an atrial tachycardia; however, Dr. Fiona Narayan stated that it has slowed gradually as we would expect in sinus tachycardia and his repeat EKG showed sinus rhythm at 98 beats per minute, it was normal EKG. He was placed on Cardizem 30 mg b.i.d. He had one other episode of \"tachycardia\", which lasted approximately 10 minutes. I am not sure again whether this is a sinus tachycardia secondary to anxiety or whether this is an actual SVT or atrial flutter. I did talk to him about doing a 30-day event recorder, which he did not wish to do. We also discussed Holter monitor, which he did not wish to do. His blood pressure is somewhat low in the 104 range and therefore, we are very limited on medications. I talked to him about staying on the Cardizem for an empiric treatment for SVT and he was fairly anxious and upset about staying on Cardizem. He was afraid he would \"get hooked on it. \"  Therefore, we elected to stop the Cardizem and just observe and watch. Again, he did not wish to do a 30-day event recorder. I talked about doing a full echocardiogram, which he did not wish to do. He has only come into the emergency room once with tachycardia. Therefore, I think it is reasonable to take the watch and wait approach. Ideally with this much anxiety, I would place him on a low dose of beta-blocker such as metoprolol 25 mg half a tablet daily. However, with his severe depression with his anxiety over taking mediations, I did not feel that this would be an option. I told him to call me if he started having more episodes of tachycardia.   If that was the situation, then it would be very helpful to have him wear a 30-day event recorder to determine whether or not he is having sinus tachycardia or SVT. In the meantime, he will stop the Cardizem and he will let me know if he has any further tachycardia. Thank you very much for allowing me the privilege of seeing Mr. Liz Baez. If you have any questions on my thoughts, please do not hesitate to contact me.     Sincerely,        Concepcion Schulte    D: 03/29/2019 15:10:58     T: 03/29/2019 16:53:09     [id number]  Job#: [id number]   Doc#: [id number]

## 2019-03-29 NOTE — PROGRESS NOTES
Ov Dr. Ivania Poole for new pt   Was in er for SVT  Has had one episode of svt since in   The er lasted 10 min  No sob  No wt loss  Bedside echo done    Stop Diltiazem    Follow up prn

## 2019-04-02 NOTE — PROGRESS NOTES
Peace Vazquez M.D. [address] [place], [place] [address]   [phone]        2019        Nieves Madrid MD  [facility], [facility]    RE:   Abigail Martell  :  1965    Dear Dr. Romulo Weston:    CHIEF COMPLAINT:  1. SVT versus sinus tachycardia. 2.  Severe depression. 3.  Severe anxiety. 4.  History of schizophrenia and bipolar disorder. HISTORY OF PRESENT ILLNESS:  Mr. Huyen Kumari is a pleasant 49-year-old gentleman, who has never had a cardiac problem in the past.  Never had a myocardial infarction, never had any cardiac testing. He has severe psychiatric issues with depression, schizophrenia, and bipolar. He has been hospitalized for depression and anxiety. He was most recently hospitalized on 2019. Those records are not available to me. He developed tachycardia and came to the emergency room on 2019. In the emergency room, he stated he had a heart rate that started approximately 2:30 in the afternoon. He arrived in ER at 4 oclock in the afternoon. His blood pressure was 108/81 with a heart rate initially of 140; although, it decreased quickly to 110 and then 95. He had no chest pain or chest discomfort. No unusual shortness of breath. I initially felt his EKG might be atrial flutter with 2:1 conduction. However, in speaking with Dr. Jorge Pace, emergency room physician, it sounds like it decreased gradually as we would expect from sinus tachycardia. His blood work was negative for an acute MI and his EKG showed no acute changes. Again, he denied any chest pain or shortness of breath with his tachycardia. We placed him on Cardizem 30 mg b.i.d. and I arranged to see him in consult. He is extremely nervous and somewhat agitated as he comes here today. He paces in the room and has difficulty with answering questions. Denies pain or discomfort. He knows his medications.   He states that he had one more episode of fast heart beat approximately 3 or 4 days after his ER visit. He is concerned about being on the Diltiazem and does not want to \"become hooked\" on any mediations. He is also afraid he is going to have to [place] it for ever. \"    CARDIAC RISK FACTORS:  Hypertension:  Negative. Other Family Members:  Positive. Peripheral Vascular Disease:  Negative. Smoking:  Negative. Hyperlipidemia:  Negative. Diabetes:  Negative. MEDICATIONS:  At home, he is currently on Cogentin 1 mg b.i.d., Atarax 50 mg b.i.d., Lamictal 200 mg daily, Desyrel 50 mg p.r.n. for sleep, Invega injections. PAST MEDICAL HISTORY:  Depression and bipolar disorder. He had toe surgery many years ago. FAMILY HISTORY:  There is a history of coronary artery disease in his family. SOCIAL HISTORY:  He is 48years old. He has no children. Lives alone and I do not believe that he is . Lives in [facility]. Does not smoke. Does not drink alcohol. REVIEW OF SYSTEMS:  We asked about constitutional, eyes, ears, nose and throat, cardiovascular, respiratory, GI, , musculoskeletal, integumentary, neurologic, psychiatric, endocrine, hematologic, and allergic/immunologic and was negative. PHYSICAL EXAMINATION:   VITAL SIGNS:  His blood pressure was 102/60 with a heart rate of 80 and regular. Respiratory rate 18. He weighed 158 pounds. O2 saturation was 97%. GENERAL:  He is a very anxious 68-year-old gentleman, who appeared to have tardive mouth movements. He answered very slowly in one word answers. He avoided eye contact and shifted continuously in his chair. HEENT:  The pupils are equally round and intact. Mucous membranes were dry. NECK:  No JVD. Good carotid pulses. No carotid bruits. No lymphadenopathy or thyromegaly. CARDIOVASCULAR EXAM:  S1 and S2 were normal.  No S3 or S4. Soft systolic blowing type murmur. No diastolic murmur.   PMI was normal.  No lift, thrust, or pericardial friction rub. LUNGS:  Quite clear to auscultation and percussion. ABDOMEN:  Soft and nontender. Good bowel sounds. EXTREMITIES:  Good femoral pulses. Good pedal pulses. No pedal edema. Skin was warm and dry. No calf tenderness. Nail beds pink. Good cap refill. LABORATORY DATA:  From the emergency room, his sodium is 138, potassium 3.9, BUN 13, creatinine 1.10, GFR greater than 60. Calcium 9.8. Troponin less than 0.03. ALT was 9, ALT was 13. White count 5.5, hemoglobin 15.1 with a platelet count 261,590. EKG initially showed tachycardia at 147 beats per minute. I could not distinguish the P waves; although, they may have been buried in the QRS complex. Repeat EKG showed sinus rhythm at 96 beats per minute and was normal.    Bedside echocardiogram appeared to have normal EF of 50% to 55%, the right-sided chamber seemed generous; although, I had very limited visibility. IMPRESSION:  1. Tachycardia at 147 beats per minute when he came into the emergency room on 03/13, which initially appeared to be atrial flutter with 2:1 conduction; although when it slowed, it slowed gradually consistent with sinus tachycardia. 2.  Severe bipolar, schizophrenia, anxiety, severe depression. 3.  Appears to have tardive dyskinesia movements of his mouth. PLAN:  1. Stop Cardizem. 2.  Will see as needed. DISCUSSION:  Mr. Prasad De La Fuente was very anxious when he saw me in my office. I got a very limited history, but he denied any chest pain or unusual shortness of breath. I asked about change in activity, and he was not sure what that meant. I asked if he was fairly active at home, and he stated that he walks occasionally in his house. I did not get a sense that there has been a change in his activity level. He also denied any chest pain or chest discomfort. His tachycardia is difficult to discern.   On admission, his heart rate was 147 and again, I would initially feel that this was an atrial flutter any questions on my thoughts, please do not hesitate to contact me.     Sincerely,        Melvina Lucio    D: 03/29/2019 15:10:58     T: 03/29/2019 16:53:09     [id number]  Job#: [id number]   Doc#: [id number]

## 2019-09-20 ENCOUNTER — OFFICE VISIT (OUTPATIENT)
Dept: FAMILY MEDICINE CLINIC | Age: 54
End: 2019-09-20
Payer: COMMERCIAL

## 2019-09-20 VITALS
SYSTOLIC BLOOD PRESSURE: 100 MMHG | HEIGHT: 69 IN | DIASTOLIC BLOOD PRESSURE: 60 MMHG | BODY MASS INDEX: 27.55 KG/M2 | HEART RATE: 66 BPM | WEIGHT: 186 LBS

## 2019-09-20 DIAGNOSIS — M25.551 HIP PAIN, ACUTE, RIGHT: Primary | ICD-10-CM

## 2019-09-20 PROCEDURE — 99213 OFFICE O/P EST LOW 20 MIN: CPT | Performed by: INTERNAL MEDICINE

## 2019-09-20 PROCEDURE — 3017F COLORECTAL CA SCREEN DOC REV: CPT | Performed by: INTERNAL MEDICINE

## 2019-09-20 PROCEDURE — 1036F TOBACCO NON-USER: CPT | Performed by: INTERNAL MEDICINE

## 2019-09-20 PROCEDURE — G8427 DOCREV CUR MEDS BY ELIG CLIN: HCPCS | Performed by: INTERNAL MEDICINE

## 2019-09-20 PROCEDURE — G8419 CALC BMI OUT NRM PARAM NOF/U: HCPCS | Performed by: INTERNAL MEDICINE

## 2019-09-20 RX ORDER — ARIPIPRAZOLE 10 MG/1
10 TABLET ORAL DAILY
COMMUNITY

## 2019-09-20 RX ORDER — ARIPIPRAZOLE 10 MG/1
TABLET ORAL
Refills: 1 | COMMUNITY
Start: 2019-09-12 | End: 2019-09-20

## 2019-09-20 RX ORDER — NAPROXEN 375 MG/1
375 TABLET ORAL 2 TIMES DAILY WITH MEALS
Qty: 20 TABLET | Refills: 0 | Status: SHIPPED | OUTPATIENT
Start: 2019-09-20 | End: 2019-09-30

## 2019-09-20 ASSESSMENT — ENCOUNTER SYMPTOMS
SORE THROAT: 0
WHEEZING: 0
ALLERGIC/IMMUNOLOGIC NEGATIVE: 1
SHORTNESS OF BREATH: 0
NAUSEA: 0
COUGH: 0
ABDOMINAL PAIN: 0
TROUBLE SWALLOWING: 0
BLOOD IN STOOL: 0
CONSTIPATION: 0

## 2019-09-20 NOTE — PROGRESS NOTES
HPI Notes    Name: Radha Hackett  : 1965         Chief Complaint:     Chief Complaint   Patient presents with    Hip Pain     right side hip pain 1 month. Has tried chiropractor with little relief       History of Present Illness:        Allyson Ferguson presents to office for evaluation of the  right side  hip pain for about one month duration. Allyson Ferguson is in much better mood today than the last time I saw him. He is smiling a lot and very talkative. He states he is doing a lot better. His depression and anxiety is a lot better. He follows up with psychiatrist Dr. Tomy Trivedi has been  helping his parents with gardening. Has been carrying buckets of water, unloading a wagon of hay, helping his brother. Has been doing a lot of jumping from the wagon and the truck, sometimes had to elevate his leg very high and stretch it to get into the truck . Hip Pain    The incident occurred more than 1 week ago. The incident occurred in the yard. The pain is present in the right hip. The quality of the pain is described as aching. The pain is moderate. The pain has been fluctuating since onset. Pertinent negatives include no inability to bear weight, loss of motion, loss of sensation, muscle weakness, numbness or tingling. Exacerbated by: when lifts up his R leg, running,  He has tried nothing for the symptoms. Past Medical History:     Past Medical History:   Diagnosis Date    Bipolar disorder ([facility] [address]     Depression       Reviewed all health maintenance requirements and orderedappropriate tests  Health Maintenance Due   Topic Date Due    Hepatitis C screen  1965    HIV screen  1980    Shingles Vaccine (1 of 2) 2015    Flu vaccine (1) 2019       Past Surgical History:     Past Surgical History:   Procedure Laterality Date    TOE SURGERY          Medications:       Prior to Admission medications    Medication Sig Start Date End Date Taking?  Authorizing Provider ARIPiprazole (ABILIFY) 10 MG tablet Take 10 mg by mouth daily   Yes Historical Provider, MD   naproxen (NAPROSYN) 375 MG tablet Take 1 tablet by mouth 2 times daily (with meals) for 10 days 9/20/19 9/30/19 Yes Vikash Rene MD   hydrOXYzine (ATARAX) 50 MG tablet Take 50 mg by mouth 2 times daily 2/7/19  Yes Historical Provider, MD   benztropine (COGENTIN) 1 MG tablet Take 1 tablet by mouth 2 times daily 1/17/19  Yes Johnson Every, APRN - CNP   lamoTRIgine (LAMICTAL) 200 MG tablet Take 1 tablet by mouth daily 1/18/19  Yes Johnson Every, APRN - CNP   traZODone (DESYREL) 50 MG tablet Take 1 tablet by mouth nightly as needed for Sleep 1/17/19  Yes Johnson Every, APRN - CNP        Allergies:       Patient has no known allergies. Social History:     Tobacco: reports that he has never smoked. He has never used smokeless tobacco.  Alcohol:      reports that he does not drink alcohol. Drug Use:  reports that he does not use drugs. Family History:     Family History   Problem Relation Age of Onset    Diabetes Mother     Hypertension Father     Bipolar Disorder Maternal Grandfather     Coronary Art Dis Maternal Grandfather        Review of Systems:         Review of Systems   Constitutional: Negative for activity change, appetite change, fatigue and unexpected weight change. HENT: Negative for congestion, ear discharge, ear pain, sore throat and trouble swallowing. Eyes: Negative for visual disturbance. Respiratory: Negative for cough, shortness of breath and wheezing. Cardiovascular: Negative for chest pain, palpitations and leg swelling. Gastrointestinal: Negative for abdominal pain, blood in stool, constipation and nausea. Endocrine: Negative for cold intolerance, heat intolerance, polydipsia and polyuria. Genitourinary: Negative for difficulty urinating and dysuria. Musculoskeletal: Positive for arthralgias (hip pain). Skin: Negative for rash.    Allergic/Immunologic:

## 2022-11-16 ENCOUNTER — TELEPHONE (OUTPATIENT)
Dept: PRIMARY CARE CLINIC | Age: 57
End: 2022-11-16

## 2022-11-16 NOTE — TELEPHONE ENCOUNTER
Patient here for a new patient appointment. Patient came back to room and upon rooming patient writer asked patient if he was here for a new patient with Jakub Beltran. Patient voiced that he is a Dr. Patito Tao patient and just sick and she didn't have any openings. Patient was informed if he becomes a [facility] patient he cannot go back to Dr. Patito Tao. Patient got up and left the room.

## 2023-06-09 ENCOUNTER — HOSPITAL ENCOUNTER (EMERGENCY)
Age: 58
Discharge: HOME | End: 2023-06-09
Attending: EMERGENCY MEDICINE

## 2023-06-09 VITALS
RESPIRATION RATE: 16 BRPM | SYSTOLIC BLOOD PRESSURE: 128 MMHG | OXYGEN SATURATION: 8 % | TEMPERATURE: 98.1 F | BODY MASS INDEX: 26.05 KG/M2 | HEART RATE: 106 BPM | DIASTOLIC BLOOD PRESSURE: 90 MMHG | WEIGHT: 176.4 LBS

## 2023-06-09 DIAGNOSIS — F31.9 BIPOLAR 1 DISORDER (HCC): Primary | ICD-10-CM

## 2023-06-09 RX ORDER — PROPRANOLOL HYDROCHLORIDE 10 MG/1
10 TABLET ORAL 2 TIMES DAILY
COMMUNITY
Start: 2023-04-26 | End: 2023-06-09

## 2023-06-09 RX ORDER — LITHIUM CARBONATE 300 MG
300 TABLET ORAL 3 TIMES DAILY
COMMUNITY
Start: 2023-04-26 | End: 2023-06-09

## 2023-06-09 RX ORDER — LORAZEPAM 1 MG/1
1 TABLET ORAL 2 TIMES DAILY
COMMUNITY
Start: 2023-04-28 | End: 2023-06-09

## 2023-06-09 RX ORDER — TRAZODONE HYDROCHLORIDE 50 MG/1
50 TABLET ORAL NIGHTLY PRN
Qty: 30 TABLET | Refills: 0 | Status: SHIPPED | OUTPATIENT
Start: 2023-06-09

## 2023-06-09 RX ORDER — FLUOXETINE HYDROCHLORIDE 20 MG/1
20 CAPSULE ORAL DAILY
Qty: 30 CAPSULE | Refills: 0 | Status: SHIPPED | OUTPATIENT
Start: 2023-06-09

## 2023-06-09 ASSESSMENT — LIFESTYLE VARIABLES: HOW OFTEN DO YOU HAVE A DRINK CONTAINING ALCOHOL: NEVER

## 2023-06-09 ASSESSMENT — PAIN - FUNCTIONAL ASSESSMENT: PAIN_FUNCTIONAL_ASSESSMENT: NONE - DENIES PAIN

## 2023-06-09 NOTE — ED PROVIDER NOTES
DIAGNOSIS/MDM:    Patient Course: Patient be discharged home on Prozac and Vistaril. Patient is not taking good the rest of his medications. They will be discontinued. Patient is to follow-up with Lavenia Klinefelter to get reestablished. ED Medications administered this visit:  Medications - No data to display    New Prescriptions from this visit:    New Prescriptions    FLUOXETINE (PROZAC) 20 MG CAPSULE    Take 1 capsule by mouth daily       Follow-up:  Wili Milner MD  [address] [place] [address]  [phone]    In 1 month          Final Impression:   1.  Bipolar 1 disorder ([facility] [address]               (Please note that portions of this note were completed with a voice recognition program.  Efforts were made to edit the dictations but occasionally words are mis-transcribed.)         Therese Hernandez MD  06/09/23 2177

## 2023-07-07 ENCOUNTER — CARE COORDINATION (OUTPATIENT)
Dept: CARE COORDINATION | Age: 58
End: 2023-07-07

## 2023-07-07 ENCOUNTER — OFFICE VISIT (OUTPATIENT)
Dept: FAMILY MEDICINE CLINIC | Age: 58
End: 2023-07-07
Payer: COMMERCIAL

## 2023-07-07 ENCOUNTER — HOSPITAL ENCOUNTER (OUTPATIENT)
Age: 58
Discharge: HOME | End: 2023-07-07
Payer: COMMERCIAL

## 2023-07-07 VITALS
OXYGEN SATURATION: 98 % | BODY MASS INDEX: 24.17 KG/M2 | WEIGHT: 163.2 LBS | SYSTOLIC BLOOD PRESSURE: 98 MMHG | DIASTOLIC BLOOD PRESSURE: 72 MMHG | RESPIRATION RATE: 18 BRPM | HEART RATE: 82 BPM | HEIGHT: 69 IN

## 2023-07-07 DIAGNOSIS — Z12.11 COLON CANCER SCREENING: ICD-10-CM

## 2023-07-07 DIAGNOSIS — Z13.220 LIPID SCREENING: ICD-10-CM

## 2023-07-07 DIAGNOSIS — F51.05 INSOMNIA DUE TO OTHER MENTAL DISORDER: ICD-10-CM

## 2023-07-07 DIAGNOSIS — A64 STD (MALE): ICD-10-CM

## 2023-07-07 DIAGNOSIS — R94.31 QT PROLONGATION: ICD-10-CM

## 2023-07-07 DIAGNOSIS — F31.12 BIPOLAR 1 DISORDER, MANIC, MODERATE (HCC): Primary | ICD-10-CM

## 2023-07-07 DIAGNOSIS — F99 INSOMNIA DUE TO OTHER MENTAL DISORDER: ICD-10-CM

## 2023-07-07 DIAGNOSIS — Z00.00 ENCOUNTER FOR PREVENTIVE CARE: ICD-10-CM

## 2023-07-07 LAB
ALBUMIN SERPL-MCNC: 4 G/DL (ref 3.5–5.2)
ALP SERPL-CCNC: 57 U/L (ref 40–129)
ALT SERPL-CCNC: 9 U/L (ref 5–41)
ANION GAP SERPL CALCULATED.3IONS-SCNC: 9 MMOL/L (ref 9–17)
AST SERPL-CCNC: 12 U/L
BASOPHILS # BLD: 0 K/UL (ref 0–0.2)
BASOPHILS NFR BLD: 0 % (ref 0–2)
BILIRUB DIRECT SERPL-MCNC: <0.1 MG/DL
BILIRUB INDIRECT SERPL-MCNC: NORMAL MG/DL (ref 0–1)
BILIRUB SERPL-MCNC: 0.6 MG/DL (ref 0.3–1.2)
BUN SERPL-MCNC: 20 MG/DL (ref 6–20)
BUN/CREAT SERPL: 22 (ref 9–20)
CALCIUM SERPL-MCNC: 9.2 MG/DL (ref 8.6–10.4)
CHLORIDE SERPL-SCNC: 102 MMOL/L (ref 98–107)
CO2 SERPL-SCNC: 21 MMOL/L (ref 20–31)
CREAT SERPL-MCNC: 0.9 MG/DL (ref 0.7–1.2)
DIFFERENTIAL TYPE: YES
EOSINOPHIL # BLD: 0.1 K/UL (ref 0–0.4)
EOSINOPHILS RELATIVE PERCENT: 1 % (ref 0–5)
ERYTHROCYTE [DISTWIDTH] IN BLOOD BY AUTOMATED COUNT: 13.2 % (ref 12.1–15.2)
GFR SERPL CREATININE-BSD FRML MDRD: >60 ML/MIN/1.73M2
GLUCOSE SERPL-MCNC: 198 MG/DL (ref 70–99)
HCT VFR BLD AUTO: 39 % (ref 41–53)
HGB BLD-MCNC: 13.4 G/DL (ref 13.5–17.5)
LYMPHOCYTES # BLD: 15 % (ref 13–44)
LYMPHOCYTES NFR BLD: 0.9 K/UL (ref 1–4.8)
MCH RBC QN AUTO: 30.3 PG (ref 26–34)
MCHC RBC AUTO-ENTMCNC: 34.5 G/DL (ref 31–37)
MCV RBC AUTO: 87.7 FL (ref 80–100)
MONOCYTES NFR BLD: 0.3 K/UL (ref 0–1)
MONOCYTES NFR BLD: 5 % (ref 5–9)
NEUTROPHILS NFR BLD: 79 % (ref 39–75)
NEUTS SEG NFR BLD: 5 K/UL (ref 2.1–6.5)
PLATELET # BLD AUTO: 277 K/UL (ref 140–450)
POTASSIUM SERPL-SCNC: 3.7 MMOL/L (ref 3.7–5.3)
PROT SERPL-MCNC: 6.4 G/DL (ref 6.4–8.3)
RBC # BLD AUTO: 4.45 M/UL (ref 4.5–5.9)
SODIUM SERPL-SCNC: 132 MMOL/L (ref 135–144)
TSH SERPL DL<=0.05 MIU/L-ACNC: 0.98 UIU/ML (ref 0.3–5)
WBC OTHER # BLD: 6.3 K/UL (ref 3.5–11)

## 2023-07-07 PROCEDURE — 85027 COMPLETE CBC AUTOMATED: CPT

## 2023-07-07 PROCEDURE — 99204 OFFICE O/P NEW MOD 45 MIN: CPT | Performed by: INTERNAL MEDICINE

## 2023-07-07 PROCEDURE — 80076 HEPATIC FUNCTION PANEL: CPT

## 2023-07-07 PROCEDURE — 86803 HEPATITIS C AB TEST: CPT

## 2023-07-07 PROCEDURE — 80048 BASIC METABOLIC PNL TOTAL CA: CPT

## 2023-07-07 PROCEDURE — 80061 LIPID PANEL: CPT

## 2023-07-07 PROCEDURE — 87389 HIV-1 AG W/HIV-1&-2 AB AG IA: CPT

## 2023-07-07 PROCEDURE — 84443 ASSAY THYROID STIM HORMONE: CPT

## 2023-07-07 PROCEDURE — 36415 COLL VENOUS BLD VENIPUNCTURE: CPT

## 2023-07-07 PROCEDURE — 93005 ELECTROCARDIOGRAM TRACING: CPT

## 2023-07-07 RX ORDER — QUETIAPINE FUMARATE 25 MG/1
TABLET, FILM COATED ORAL
COMMUNITY

## 2023-07-07 RX ORDER — FLUOXETINE HYDROCHLORIDE 20 MG/1
20 CAPSULE ORAL DAILY
Qty: 30 CAPSULE | Refills: 5 | Status: SHIPPED | OUTPATIENT
Start: 2023-07-07

## 2023-07-07 RX ORDER — ARIPIPRAZOLE 10 MG/1
10 TABLET ORAL DAILY
Qty: 30 TABLET | Refills: 5 | Status: SHIPPED | OUTPATIENT
Start: 2023-07-07

## 2023-07-07 RX ORDER — MIRTAZAPINE 30 MG/1
TABLET, FILM COATED ORAL
COMMUNITY
End: 2023-07-07

## 2023-07-07 RX ORDER — TRAZODONE HYDROCHLORIDE 100 MG/1
100 TABLET ORAL NIGHTLY
Qty: 90 TABLET | Refills: 1 | Status: SHIPPED | OUTPATIENT
Start: 2023-07-07

## 2023-07-07 RX ORDER — FLUOXETINE HYDROCHLORIDE 20 MG/1
20 CAPSULE ORAL DAILY
COMMUNITY
End: 2023-07-07 | Stop reason: SDUPTHER

## 2023-07-07 RX ORDER — TRAZODONE HYDROCHLORIDE 50 MG/1
50 TABLET ORAL NIGHTLY
COMMUNITY
End: 2023-07-07 | Stop reason: DRUGHIGH

## 2023-07-07 SDOH — ECONOMIC STABILITY: INCOME INSECURITY: HOW HARD IS IT FOR YOU TO PAY FOR THE VERY BASICS LIKE FOOD, HOUSING, MEDICAL CARE, AND HEATING?: NOT HARD AT ALL

## 2023-07-07 SDOH — ECONOMIC STABILITY: FOOD INSECURITY: WITHIN THE PAST 12 MONTHS, YOU WORRIED THAT YOUR FOOD WOULD RUN OUT BEFORE YOU GOT MONEY TO BUY MORE.: NEVER TRUE

## 2023-07-07 SDOH — ECONOMIC STABILITY: HOUSING INSECURITY
IN THE LAST 12 MONTHS, WAS THERE A TIME WHEN YOU DID NOT HAVE A STEADY PLACE TO SLEEP OR SLEPT IN A SHELTER (INCLUDING NOW)?: NO

## 2023-07-07 SDOH — ECONOMIC STABILITY: FOOD INSECURITY: WITHIN THE PAST 12 MONTHS, THE FOOD YOU BOUGHT JUST DIDN'T LAST AND YOU DIDN'T HAVE MONEY TO GET MORE.: NEVER TRUE

## 2023-07-07 ASSESSMENT — PATIENT HEALTH QUESTIONNAIRE - PHQ9
SUM OF ALL RESPONSES TO PHQ QUESTIONS 1-9: 22
6. FEELING BAD ABOUT YOURSELF - OR THAT YOU ARE A FAILURE OR HAVE LET YOURSELF OR YOUR FAMILY DOWN: 3
9. THOUGHTS THAT YOU WOULD BE BETTER OFF DEAD, OR OF HURTING YOURSELF: 0
7. TROUBLE CONCENTRATING ON THINGS, SUCH AS READING THE NEWSPAPER OR WATCHING TELEVISION: 3
SUM OF ALL RESPONSES TO PHQ QUESTIONS 1-9: 22
8. MOVING OR SPEAKING SO SLOWLY THAT OTHER PEOPLE COULD HAVE NOTICED. OR THE OPPOSITE, BEING SO FIGETY OR RESTLESS THAT YOU HAVE BEEN MOVING AROUND A LOT MORE THAN USUAL: 3
5. POOR APPETITE OR OVEREATING: 3
1. LITTLE INTEREST OR PLEASURE IN DOING THINGS: 3
2. FEELING DOWN, DEPRESSED OR HOPELESS: 3
10. IF YOU CHECKED OFF ANY PROBLEMS, HOW DIFFICULT HAVE THESE PROBLEMS MADE IT FOR YOU TO DO YOUR WORK, TAKE CARE OF THINGS AT HOME, OR GET ALONG WITH OTHER PEOPLE: 2
SUM OF ALL RESPONSES TO PHQ QUESTIONS 1-9: 22
4. FEELING TIRED OR HAVING LITTLE ENERGY: 2
SUM OF ALL RESPONSES TO PHQ9 QUESTIONS 1 & 2: 6
3. TROUBLE FALLING OR STAYING ASLEEP: 2
SUM OF ALL RESPONSES TO PHQ QUESTIONS 1-9: 22

## 2023-07-07 ASSESSMENT — COLUMBIA-SUICIDE SEVERITY RATING SCALE - C-SSRS
6. HAVE YOU EVER DONE ANYTHING, STARTED TO DO ANYTHING, OR PREPARED TO DO ANYTHING TO END YOUR LIFE?: NO
2. HAVE YOU ACTUALLY HAD ANY THOUGHTS OF KILLING YOURSELF?: NO
1. WITHIN THE PAST MONTH, HAVE YOU WISHED YOU WERE DEAD OR WISHED YOU COULD GO TO SLEEP AND NOT WAKE UP?: NO

## 2023-07-08 LAB
CHOLEST SERPL-MCNC: 151 MG/DL
CHOLESTEROL/HDL RATIO: 3
EKG ATRIAL RATE: 65 BPM
EKG P AXIS: 51 DEGREES
EKG P-R INTERVAL: 184 MS
EKG Q-T INTERVAL: 430 MS
EKG QRS DURATION: 96 MS
EKG QTC CALCULATION (BAZETT): 447 MS
EKG R AXIS: 69 DEGREES
EKG T AXIS: 57 DEGREES
EKG VENTRICULAR RATE: 65 BPM
HCV AB SERPL QL IA: NONREACTIVE
HDLC SERPL-MCNC: 50 MG/DL
HIV 1+2 AB+HIV1 P24 AG SERPL QL IA: NONREACTIVE
LDLC SERPL CALC-MCNC: 87 MG/DL (ref 0–130)
TRIGL SERPL-MCNC: 70 MG/DL

## 2023-07-11 DIAGNOSIS — F31.12 BIPOLAR 1 DISORDER, MANIC, MODERATE (HCC): Primary | ICD-10-CM

## 2023-07-11 NOTE — TELEPHONE ENCOUNTER
Requested information was sent by fax to [facility] # [phone]

## 2023-07-12 ASSESSMENT — ENCOUNTER SYMPTOMS
SHORTNESS OF BREATH: 0
CONSTIPATION: 0
ALLERGIC/IMMUNOLOGIC NEGATIVE: 1
ABDOMINAL PAIN: 0
COUGH: 0
WHEEZING: 0
SORE THROAT: 0
BLOOD IN STOOL: 0
NAUSEA: 0

## 2023-07-20 ENCOUNTER — CARE COORDINATION (OUTPATIENT)
Dept: CARE COORDINATION | Age: 58
End: 2023-07-20

## 2023-07-20 NOTE — CARE COORDINATION
ACC: Hugo Natarajan RN    CC outreach call placed to patient. Unable to reach Raffi Osborn. Left a voicemail message requesting a return phone call back to this AC when patient is able to [phone], office hours given. Future Appointments   Date Time Provider [address] [place] [address]   8/4/2023 12:00 PM Teresa Yeung MD [address] [place] [address] Coordination Plan of Care: This nurse Care Coordinator will  - await call back from patient, and if no return call; will attempt to reach patient back again next week   -has he heard from [facility]? Referral faxed 7/11. Do they take insurance? Care Coordination Interventions    Referral from Primary Care Provider: Yes  Suggested Interventions and [address]:  In Process

## 2023-07-27 ENCOUNTER — CARE COORDINATION (OUTPATIENT)
Dept: CARE COORDINATION | Age: 58
End: 2023-07-27

## 2023-07-31 ENCOUNTER — CARE COORDINATION (OUTPATIENT)
Dept: CARE COORDINATION | Age: 58
End: 2023-07-31

## 2023-08-01 NOTE — CARE COORDINATION
ACC: Leonarda Avilez RN    Call placed to [facility] counseling in [place]. Spoke with staff who confirmed they did receive referral last week. Did provide them with both home and cell phone numbers for patient. Staff state they will attempt to reach patient this week. Call placed to patient. Unable to reach Miranda Ramsay. Left a voicemail message requesting a return phone call back to this [facility] when patient is able to [phone], office hours given. Future Appointments   Date Time Provider [address] [place] [address]   8/4/2023 12:00 PM Zohra Thomas  [facility] Coordination Plan of Care: This nurse Care Coordinator will  - plan outreach call to patient following PCP appointment   -did he get scheduled with [facility]?   -follow up PCP appointment- any new CC needs, if no new needs plan to graduate from care coordination       Care Coordination Interventions    Referral from Primary Care Provider: Yes  Suggested Interventions and [address]:  In Process

## 2023-08-04 ENCOUNTER — OFFICE VISIT (OUTPATIENT)
Dept: FAMILY MEDICINE CLINIC | Age: 58
End: 2023-08-04
Payer: COMMERCIAL

## 2023-08-04 ENCOUNTER — TELEPHONE (OUTPATIENT)
Dept: FAMILY MEDICINE CLINIC | Age: 58
End: 2023-08-04

## 2023-08-04 ENCOUNTER — CARE COORDINATION (OUTPATIENT)
Dept: CARE COORDINATION | Age: 58
End: 2023-08-04

## 2023-08-04 VITALS
DIASTOLIC BLOOD PRESSURE: 62 MMHG | HEIGHT: 69 IN | HEART RATE: 64 BPM | BODY MASS INDEX: 24.19 KG/M2 | WEIGHT: 163.3 LBS | SYSTOLIC BLOOD PRESSURE: 111 MMHG | RESPIRATION RATE: 18 BRPM | OXYGEN SATURATION: 97 %

## 2023-08-04 DIAGNOSIS — F31.12 BIPOLAR 1 DISORDER, MANIC, MODERATE (HCC): Primary | ICD-10-CM

## 2023-08-04 DIAGNOSIS — F99 INSOMNIA DUE TO OTHER MENTAL DISORDER: ICD-10-CM

## 2023-08-04 DIAGNOSIS — F51.05 INSOMNIA DUE TO OTHER MENTAL DISORDER: ICD-10-CM

## 2023-08-04 PROCEDURE — 99213 OFFICE O/P EST LOW 20 MIN: CPT | Performed by: INTERNAL MEDICINE

## 2023-08-04 RX ORDER — FLUOXETINE HYDROCHLORIDE 40 MG/1
40 CAPSULE ORAL DAILY
Qty: 30 CAPSULE | Refills: 3 | Status: SHIPPED | OUTPATIENT
Start: 2023-08-04

## 2023-08-04 ASSESSMENT — ENCOUNTER SYMPTOMS
CONSTIPATION: 0
SORE THROAT: 0
ABDOMINAL PAIN: 0
BLOOD IN STOOL: 0
NAUSEA: 0
WHEEZING: 0
SHORTNESS OF BREATH: 0
ALLERGIC/IMMUNOLOGIC NEGATIVE: 1
COUGH: 0

## 2023-08-04 NOTE — TELEPHONE ENCOUNTER
Patient's sister, Belen Stephen, called office to let provider know that Abilify was $90 to  at pharmacy and is asking for a sister medicine in replacement due to his income. Belen Stephen stated she spoke with patient and he forgot to mention at recent appt. Patient uses [facility] in [place]. Please advise?

## 2023-08-04 NOTE — PROGRESS NOTES
HPI Notes    Name: Sam Urban  : 1965         Chief Complaint:     Chief Complaint   Patient presents with    Follow-up     Patient present for 4 week follow up. Patient feels medications have helped with sleeps but legs are restless and can cause falls. Pt reports last fall was 2-3 weeks ago without injury. History of Present Illness:        [facility]  presents to office to follow-up for history of severe bipolar disorder with psychotic features. He is accompanied with his father. Currently staying with his parents. Last office visit was on 2023. He was started on Abilify 10 mg daily, but takes only 1/2 tablet due to having tremors in legs. Occasionally takes Lorazepam 1 mg if symptoms worse. He is taking Prozac 20 mg daily and seems to be tolerating well. During last office visit we also prescribed trazodone 100 mg for  his insomnia. [employer] states that he is sleeping improved and he goes to bed around 10 PM and able to sleep till morning without interruptions. We ordered EKG to check for QT prolongation and it was unremarkable. Patient was checked for STDs  His hep C came back nonreactive. HIV screen was negative     [employer] states he did not follow up with a counselor. Says some messages were left on his phone but he did not call them back. Reports having  trouble with transportation. He still feels very Anxious. In the past he used to follow-up with psychiatrist Dr. Ezequiel montenegro and states he did not like her and felt that should she helped him manage his symptoms well.   We discussed referral and [employer] was agreeable with this plan        Past Medical History:     Past Medical History:   Diagnosis Date    Anxiety     Bipolar disorder ([address])     Depression       Reviewed all health maintenance requirements and orderedappropriate tests  Health Maintenance Due   Topic Date Due    COVID-19 Vaccine (1) Never done    Shingles vaccine (1 of 2) Never done    Colorectal Cancer Screen

## 2023-08-04 NOTE — CARE COORDINATION
Received message below from PCP today. MD Jena Morrell, RN  Please contact Ghulam Sharma CNP at  [phone] to coordinate care regarding counseling referral         CC outreach call placed to patients sister Jerry Leon. Unable to reach Jerry Leon. Left voicemail informing that referral was sent to [facility] in [place]- confirmed they received referral on 7/27 and had not been able to reach [place]. Informed AC had not been successful with speaking to [place]. Phone number provided for [facility] if she wishes to call to schedule or informed she can call writer back and writer can schedule if she wishes. Future Appointments   Date Time Provider [address]   9/5/2023 11:00 AM Josiane Martin MD [facility] [address] Coordination Plan of Care: This nurse Care Coordinator will  - await call back from patient sister if she wishes to have writer schedule counseling session   -plan outreach call to patient in 2 weeks- is he scheduled or attempt to reach [facility]- is he scheduled?

## 2023-08-07 ENCOUNTER — TELEPHONE (OUTPATIENT)
Dept: FAMILY MEDICINE CLINIC | Age: 58
End: 2023-08-07

## 2023-08-07 NOTE — TELEPHONE ENCOUNTER
Received VM from patient's sister- Camille West. She explained the medication prescribed by PCP was very costly compared to Abilify- $90 vs $300.00 for Vraylar. She advised the patient will complete samples given of Vraylar and resume Abilify, unless directed otherwise by counselor at [facility]. She was calling to update the provider. Pedro advise.  Thank you

## 2023-08-08 ENCOUNTER — TELEPHONE (OUTPATIENT)
Dept: FAMILY MEDICINE CLINIC | Age: 58
End: 2023-08-08

## 2023-08-08 NOTE — TELEPHONE ENCOUNTER
Received call regarding patient referral  from [facility]. They are not currently accepting medicare. Please advise.  Thank you

## 2023-08-10 ENCOUNTER — CARE COORDINATION (OUTPATIENT)
Dept: CARE COORDINATION | Age: 58
End: 2023-08-10

## 2023-08-10 NOTE — CARE COORDINATION
Ambulatory Care Coordination Note  8/10/2023    Patient Current Location: [address] [place] [address] Road contacted the family by telephone. Verified name and  with family as identifiers. Provided introduction to self, and explanation of the ACM role. Challenges to be reviewed by the provider   Additional needs identified to be addressed with provider: No  none               Method of communication with provider: none. ACM: Giovanny Georges RN    CC outreach call placed to patients sister. Spoke with Lizett Dinh who confirmed she did receive voicemail from writer last week. States her brother is not up to the point of being able to make/schedule his own appointments and she is trying to assist him. Informed her that referral was placed to [facility] in [address] back on  and Anali Perez has confirmed they received referral and they have attempted to contact patient. Lizett Dinh states she will call and see if she can get him scheduled and if in network. Lizett Dinh requesting phone number to call. Phone number was provided for [facility] [phone]. Also informed Lizett Dinh that PCP sent referral to [facility] and they were not accepting patient insurance at this time. Voiced understanding. Future Appointments   Date Time Provider [address]   2023 11:00 AM Dat Mahmood MD [facility] [address] [place] Coordination Plan of Care: This nurse Care Coordinator will  - plan outreach call to patient/sister in 2 weeks   -is [facility] in network for patient? Or do other options need explored?   -get scheduled? Offered patient enrollment in the Remote Patient Monitoring (RPM) program for in-home monitoring: Patient is not eligible for RPM program.    Care Coordination Interventions    Referral from Primary Care Provider: Yes  Suggested Interventions and [address]:  In Process

## 2023-08-11 NOTE — TELEPHONE ENCOUNTER
Contacted the patient's sister, Leonides Pierre. Was advised the patient informed by Care Coordination to be seen by [facility]. Phone #[phone]. She mentioned he will be seen at the facility as soon as next week possibly. He will be seen there by both psychiatry and counseling. Thank you.

## 2023-08-28 ENCOUNTER — CARE COORDINATION (OUTPATIENT)
Dept: CARE COORDINATION | Age: 58
End: 2023-08-28

## 2023-08-28 NOTE — CARE COORDINATION
Ambulatory Care Coordination Note  8/28/2023    CC outreach call placed to patient. Unable to reach Angel Solares. Left a voicemail message requesting a return phone call back to this [facility] when patient is able to [phone], office hours given. Call placed to [facility] in [place]. Spoke with staff who state that they no longer have anyone in their system with patient name. Informed that Jayro Kingston has followed up on this referral several times and did have confirmation that referral was received at [place] office. Staff stated that if they are not able to reach the patient after several attempts then the referral is cancelled out. Future Appointments   Date Time Provider [address]   9/5/2023 11:00 AM Tiffany Lynn MD [facility] [address] [place] Coordination Plan of Care: This nurse Care Coordinator will  - await call back from patient, and if no return call; will attempt to reach patient back again in 1 week.   -update PCP on referral if unable to reach patient prior to appointment next week.   -if unable to reach patient will plan to resolve episode due to unable to reach patient.

## 2023-09-05 ENCOUNTER — CARE COORDINATION (OUTPATIENT)
Dept: CARE COORDINATION | Age: 58
End: 2023-09-05

## 2023-09-05 ENCOUNTER — OFFICE VISIT (OUTPATIENT)
Dept: FAMILY MEDICINE CLINIC | Age: 58
End: 2023-09-05
Payer: COMMERCIAL

## 2023-09-05 VITALS
HEART RATE: 67 BPM | HEIGHT: 69 IN | RESPIRATION RATE: 18 BRPM | BODY MASS INDEX: 24.29 KG/M2 | SYSTOLIC BLOOD PRESSURE: 98 MMHG | OXYGEN SATURATION: 97 % | DIASTOLIC BLOOD PRESSURE: 72 MMHG | WEIGHT: 164 LBS

## 2023-09-05 DIAGNOSIS — Z12.11 COLON CANCER SCREENING: ICD-10-CM

## 2023-09-05 DIAGNOSIS — F31.12 BIPOLAR 1 DISORDER, MANIC, MODERATE (HCC): Primary | ICD-10-CM

## 2023-09-05 PROCEDURE — 99213 OFFICE O/P EST LOW 20 MIN: CPT | Performed by: INTERNAL MEDICINE

## 2023-09-05 RX ORDER — LORAZEPAM 0.5 MG/1
0.5 TABLET ORAL DAILY PRN
Qty: 30 TABLET | Refills: 0
Start: 2023-09-05 | End: 2023-10-05

## 2023-09-05 ASSESSMENT — ENCOUNTER SYMPTOMS
COUGH: 0
WHEEZING: 0
SHORTNESS OF BREATH: 0
SORE THROAT: 0
ALLERGIC/IMMUNOLOGIC NEGATIVE: 1
ABDOMINAL PAIN: 0
CONSTIPATION: 0
NAUSEA: 0
BLOOD IN STOOL: 0

## 2023-09-05 NOTE — CARE COORDINATION
Ambulatory Care Coordination Note  9/5/2023    CC outreach call placed to patient. Unable to reach Wili Babb on either phone number listed. Left a voicemail message requesting a return phone call back to this [facility] when patient is able to [phone], office hours given. -Noted he attended PCP appointment today. Charted that he is not currently attending counseling as it is too expensive. Reported medication is working better now and he feels he is doing better.     -Have been unsuccessful at [facility] to patient for care coordination.     -Numerous local counseling facilities contacted and do not accept patient insurance. Patient sister, Dia Hampton aware. Future Appointments   Date Time Provider [address]   9/29/2023 11:30 AM MD Shahana Neville   11/6/2023  1:45 PM Mxai Laurent MD [address] Coordination Plan of Care:    This nurse Care Coordinator will  - plan to resolve active care coordination episode due to unable to reach patient   -remove name from care team

## 2023-09-05 NOTE — PROGRESS NOTES
HPI Notes    Name: Fely   : 1965         Chief Complaint:     Chief Complaint   Patient presents with    Follow-up     Patient states he has the daily shakes and is wondering of its his meds he would like to discuss, Patient takes he takes 1/2 Lorazepam to calm him down when needed. History of Present Illness:        HPI    Aniyah Yanes presents to office to follow up for bipolar disorder. He is more alert and talkative today. He feels that overall he is doing better but still gets episodes of restlessness and shakiness in legs. He is currently on Prozac 40 mg  daily. We stopped Abilify as Aniyah Yanes was not able to afford it. He tried Vraylar samples provided by  Carlos Stuart CNP and he feels it's helping him. Says Abilify was putting him to sleep, but Duard Albino is tolerated better. He takes Lorazepam 1/2 tab if develops shakes which is almost daily  Aniyah Yanes is not in counseling presently because his Insurance is not covering the cost.   We referred him to care coordinator Ty Rogers to help with referral to counseling. Per Luci's report outreach call was placed to patient but he did not answer his phone call and did not call back. Unfortunately numerous local counseling facilities that were contacted did not accept his Insurance. Difficult situation. ... Takes Trazodone for Insomnia . Says able to sleep about 5 hours uninterrupted sleep.           Past Medical History:     Past Medical History:   Diagnosis Date    Anxiety     Bipolar disorder ([address])     Depression       Reviewed all health maintenance requirements and orderedappropriate tests  Health Maintenance Due   Topic Date Due    COVID-19 Vaccine (1) Never done    Shingles vaccine (1 of 2) Never done    Colorectal Cancer Screen  2022    Annual Wellness Visit (AWV)  Never done    Flu vaccine (1) Never done       Past Surgical History:     Past Surgical History:   Procedure Laterality Date    TOE SURGERY          Medications:

## 2023-10-19 ENCOUNTER — TELEPHONE (OUTPATIENT)
Dept: FAMILY MEDICINE CLINIC | Age: 58
End: 2023-10-19

## 2023-10-19 NOTE — TELEPHONE ENCOUNTER
Called patient to schedule colonoscopy consult per his Sis Je Garciabush  Patient has declined the request and I strongly encouraged to at least do the cologuard box we sent. Patient agreed and stated he would try.

## 2023-11-06 ENCOUNTER — OFFICE VISIT (OUTPATIENT)
Dept: FAMILY MEDICINE CLINIC | Age: 58
End: 2023-11-06
Payer: COMMERCIAL

## 2023-11-06 VITALS
HEIGHT: 69 IN | OXYGEN SATURATION: 98 % | RESPIRATION RATE: 18 BRPM | WEIGHT: 171.2 LBS | HEART RATE: 58 BPM | DIASTOLIC BLOOD PRESSURE: 59 MMHG | SYSTOLIC BLOOD PRESSURE: 87 MMHG | BODY MASS INDEX: 25.36 KG/M2

## 2023-11-06 DIAGNOSIS — I95.2 HYPOTENSION DUE TO DRUGS: ICD-10-CM

## 2023-11-06 DIAGNOSIS — Z23 NEED FOR INFLUENZA VACCINATION: ICD-10-CM

## 2023-11-06 DIAGNOSIS — F31.70 BIPOLAR AFFECTIVE DISORDER IN REMISSION (HCC): Primary | ICD-10-CM

## 2023-11-06 PROCEDURE — 90674 CCIIV4 VAC NO PRSV 0.5 ML IM: CPT | Performed by: INTERNAL MEDICINE

## 2023-11-06 PROCEDURE — G0008 ADMIN INFLUENZA VIRUS VAC: HCPCS | Performed by: INTERNAL MEDICINE

## 2023-11-06 PROCEDURE — 99214 OFFICE O/P EST MOD 30 MIN: CPT | Performed by: INTERNAL MEDICINE

## 2023-11-06 RX ORDER — ALPRAZOLAM 1 MG/1
1 TABLET ORAL 2 TIMES DAILY PRN
COMMUNITY
Start: 2023-09-20 | End: 2023-11-06 | Stop reason: ALTCHOICE

## 2023-11-06 RX ORDER — LITHIUM CARBONATE 300 MG
300 TABLET ORAL 2 TIMES DAILY
COMMUNITY
Start: 2023-10-13

## 2023-11-06 RX ORDER — PROPRANOLOL HYDROCHLORIDE 40 MG/1
40 TABLET ORAL 2 TIMES DAILY
COMMUNITY
Start: 2023-10-24

## 2023-11-06 RX ORDER — RISPERIDONE 1 MG/1
1 TABLET ORAL DAILY
COMMUNITY
Start: 2023-10-23

## 2023-11-06 ASSESSMENT — ENCOUNTER SYMPTOMS
SORE THROAT: 0
SHORTNESS OF BREATH: 0
COUGH: 0
WHEEZING: 0
ABDOMINAL PAIN: 0
NAUSEA: 0
CONSTIPATION: 0
BLOOD IN STOOL: 0
ALLERGIC/IMMUNOLOGIC NEGATIVE: 1

## 2023-11-06 NOTE — PROGRESS NOTES
HPI Notes    Name: Natasha Lopez  : 1965         Chief Complaint:     Chief Complaint   Patient presents with    Follow-up     Patient states he is doing well, no more shakes. Patient is in [facility] health service in [place] and his Dr has him under control. History of Present Illness:        HPI    Leticia Altamirano presents to office to follow up for bipolar disorder    States was evaluated by psychiatry CNP Erasto Garcia  at [facility] in [place] about one month ago  and started on Risperdal,  Lithium and Propranolol. Says he is  weaned off Prozac, Jerolyn Param and he is not taking Xanax anymore. Says  his psychiatrist felt that his  symptoms were more related to manic stage and not depression   Patient reports feeling a lot better. He does not have shakes anymore. He is sleeping better. Still takes Trazodone for Insomnia. Overall his functioning improved. His blood pressure today is somewhat low 87/59 and HR is 58. He is asymptomatic. Reports no dizziness, no weakness, no CP. Advised to discuss with his psychiatrist reducing Propranolol dose in half. Past Medical History:     Past Medical History:   Diagnosis Date    Anxiety     Bipolar disorder ([address])     Depression       Reviewed all health maintenance requirements and orderedappropriate tests  Health Maintenance Due   Topic Date Due    Hepatitis B vaccine (1 of 3 - 3-dose series) Never done    COVID-19 Vaccine (1) Never done    Shingles vaccine (1 of 2) Never done    Diabetes screen  2022    Colorectal Cancer Screen  2022    Annual Wellness Visit (AWV)  Never done       Past Surgical History:     Past Surgical History:   Procedure Laterality Date    TOE SURGERY          Medications:       Prior to Admission medications    Medication Sig Start Date End Date Taking?  Authorizing Provider   lithium 300 MG tablet Take 1 tablet by mouth 2 times daily 10/13/23  Yes Provider, MD Carmen   risperiDONE (RISPERDAL) 1 MG tablet Take 1 tablet

## 2024-03-21 ENCOUNTER — OFFICE VISIT (OUTPATIENT)
Dept: FAMILY MEDICINE CLINIC | Age: 59
End: 2024-03-21
Payer: COMMERCIAL

## 2024-03-21 VITALS
WEIGHT: 167.8 LBS | SYSTOLIC BLOOD PRESSURE: 98 MMHG | BODY MASS INDEX: 26.34 KG/M2 | DIASTOLIC BLOOD PRESSURE: 64 MMHG | RESPIRATION RATE: 18 BRPM | OXYGEN SATURATION: 67 % | HEIGHT: 67 IN | HEART RATE: 98 BPM

## 2024-03-21 DIAGNOSIS — Z00.00 INITIAL MEDICARE ANNUAL WELLNESS VISIT: ICD-10-CM

## 2024-03-21 DIAGNOSIS — F31.12 BIPOLAR 1 DISORDER, MANIC, MODERATE (HCC): ICD-10-CM

## 2024-03-21 DIAGNOSIS — F31.70 BIPOLAR AFFECTIVE DISORDER IN REMISSION (HCC): Primary | ICD-10-CM

## 2024-03-21 PROCEDURE — G0438 PPPS, INITIAL VISIT: HCPCS | Performed by: INTERNAL MEDICINE

## 2024-03-21 PROCEDURE — 99213 OFFICE O/P EST LOW 20 MIN: CPT | Performed by: INTERNAL MEDICINE

## 2024-03-21 RX ORDER — ALPRAZOLAM 1 MG/1
1 TABLET ORAL EVERY MORNING
COMMUNITY
End: 2024-03-21 | Stop reason: SDUPTHER

## 2024-03-21 RX ORDER — PROPRANOLOL HYDROCHLORIDE 40 MG/1
40 TABLET ORAL 2 TIMES DAILY
Qty: 90 TABLET | Refills: 1 | Status: SHIPPED | OUTPATIENT
Start: 2024-03-21

## 2024-03-21 RX ORDER — ALPRAZOLAM 1 MG/1
1 TABLET ORAL EVERY MORNING
Qty: 30 TABLET | Refills: 0 | Status: SHIPPED | OUTPATIENT
Start: 2024-03-21 | End: 2024-04-20

## 2024-03-21 RX ORDER — RISPERIDONE 1 MG/1
1 TABLET ORAL DAILY
Qty: 90 TABLET | Refills: 1 | Status: SHIPPED | OUTPATIENT
Start: 2024-03-21

## 2024-03-21 RX ORDER — FLUOXETINE HYDROCHLORIDE 20 MG/1
20 CAPSULE ORAL DAILY
Qty: 30 CAPSULE | Refills: 3 | Status: SHIPPED | OUTPATIENT
Start: 2024-03-21

## 2024-03-21 RX ORDER — TRAZODONE HYDROCHLORIDE 100 MG/1
100 TABLET ORAL NIGHTLY
Qty: 90 TABLET | Refills: 1 | Status: SHIPPED | OUTPATIENT
Start: 2024-03-21

## 2024-03-21 ASSESSMENT — PATIENT HEALTH QUESTIONNAIRE - PHQ9
5. POOR APPETITE OR OVEREATING: NOT AT ALL
6. FEELING BAD ABOUT YOURSELF - OR THAT YOU ARE A FAILURE OR HAVE LET YOURSELF OR YOUR FAMILY DOWN: NOT AT ALL
SUM OF ALL RESPONSES TO PHQ QUESTIONS 1-9: 6
9. THOUGHTS THAT YOU WOULD BE BETTER OFF DEAD, OR OF HURTING YOURSELF: NOT AT ALL
10. IF YOU CHECKED OFF ANY PROBLEMS, HOW DIFFICULT HAVE THESE PROBLEMS MADE IT FOR YOU TO DO YOUR WORK, TAKE CARE OF THINGS AT HOME, OR GET ALONG WITH OTHER PEOPLE: VERY DIFFICULT
8. MOVING OR SPEAKING SO SLOWLY THAT OTHER PEOPLE COULD HAVE NOTICED. OR THE OPPOSITE, BEING SO FIGETY OR RESTLESS THAT YOU HAVE BEEN MOVING AROUND A LOT MORE THAN USUAL: SEVERAL DAYS
2. FEELING DOWN, DEPRESSED OR HOPELESS: SEVERAL DAYS
SUM OF ALL RESPONSES TO PHQ QUESTIONS 1-9: 6
7. TROUBLE CONCENTRATING ON THINGS, SUCH AS READING THE NEWSPAPER OR WATCHING TELEVISION: SEVERAL DAYS
3. TROUBLE FALLING OR STAYING ASLEEP: SEVERAL DAYS
SUM OF ALL RESPONSES TO PHQ9 QUESTIONS 1 & 2: 2
4. FEELING TIRED OR HAVING LITTLE ENERGY: SEVERAL DAYS
1. LITTLE INTEREST OR PLEASURE IN DOING THINGS: SEVERAL DAYS

## 2024-03-21 ASSESSMENT — ENCOUNTER SYMPTOMS
COUGH: 0
CONSTIPATION: 0
SORE THROAT: 0
ABDOMINAL PAIN: 0
SHORTNESS OF BREATH: 0
BLOOD IN STOOL: 0
WHEEZING: 0

## 2024-03-21 ASSESSMENT — LIFESTYLE VARIABLES: HOW MANY STANDARD DRINKS CONTAINING ALCOHOL DO YOU HAVE ON A TYPICAL DAY: PATIENT DOES NOT DRINK

## 2024-03-21 NOTE — PROGRESS NOTES
HPI Notes    Name: Corky Diamond  : 1965         Chief Complaint:     Chief Complaint   Patient presents with    Medicare AWV     Patient is present with meds and needs refills       History of Present Illness:        Corky presents to office to follow up for Bipolar disorder, Anxiety and AWV    He is accompanied by his sister Janna Patiño who is one of CNPs for [facility].     Corky was following up with [facility] in [place]  with Satya Sarmiento. States visits are expensive and he would prefer to hve his refills through our office.  Currently on Risperdal 1 mg att bedtime, Trazodone, Alprazolam, Lithium, Propranolol. Has hard time sleeping.   His Prozac has been discontinued. He is not sure why. He did well on Prozac in the past , had no side effects  His Anxiety symptoms overall controlled. Has no suicidal ideations.     We discussed colonoscopy for colon cancer screening but the pt declined stating he will not be able to go through bowel prep process.                       Past Medical History:     Past Medical History:   Diagnosis Date    Anxiety     Bipolar disorder (HCC)     Depression       Reviewed all health maintenance requirements and orderedappropriate tests  Health Maintenance Due   Topic Date Due    Hepatitis B vaccine (1 of 3 - 3-dose series) Never done    COVID-19 Vaccine (1) Never done    Shingles vaccine (1 of 2) Never done    Diabetes screen  2022    Colorectal Cancer Screen  2022    Annual Wellness Visit (Medicare Advantage)  Never done       Past Surgical History:     Past Surgical History:   Procedure Laterality Date    TOE SURGERY          Medications:       Prior to Admission medications    Medication Sig Start Date End Date Taking? Authorizing Provider   FLUoxetine (PROZAC) 20 MG capsule Take 1 capsule by mouth daily 3/21/24  Yes lAton Matthew MD   traZODone (DESYREL) 100 MG tablet Take 1 tablet by mouth nightly 3/21/24  Yes Alton Matthew MD

## 2024-03-28 ENCOUNTER — TELEPHONE (OUTPATIENT)
Dept: FAMILY MEDICINE CLINIC | Age: 59
End: 2024-03-28

## 2024-03-28 DIAGNOSIS — F31.12 BIPOLAR 1 DISORDER, MANIC, MODERATE (HCC): Primary | ICD-10-CM

## 2024-03-28 NOTE — TELEPHONE ENCOUNTER
Please call Janna Patiño tomorrow. She wanted you to know with the med changes he is worse, shaky and scared is how he described it.

## 2024-04-15 RX ORDER — LOSARTAN POTASSIUM 25 MG/1
25 TABLET ORAL DAILY
Qty: 30 TABLET | Refills: 1 | Status: SHIPPED | OUTPATIENT
Start: 2024-04-15

## 2024-05-30 ENCOUNTER — OFFICE VISIT (OUTPATIENT)
Dept: FAMILY MEDICINE CLINIC | Age: 59
End: 2024-05-30
Payer: COMMERCIAL

## 2024-05-30 VITALS
HEART RATE: 72 BPM | RESPIRATION RATE: 18 BRPM | HEIGHT: 67 IN | DIASTOLIC BLOOD PRESSURE: 78 MMHG | WEIGHT: 150.4 LBS | BODY MASS INDEX: 23.61 KG/M2 | OXYGEN SATURATION: 98 % | SYSTOLIC BLOOD PRESSURE: 108 MMHG

## 2024-05-30 DIAGNOSIS — F31.12 BIPOLAR 1 DISORDER, MANIC, MODERATE (HCC): ICD-10-CM

## 2024-05-30 PROCEDURE — 99214 OFFICE O/P EST MOD 30 MIN: CPT | Performed by: INTERNAL MEDICINE

## 2024-05-30 RX ORDER — ALPRAZOLAM 0.5 MG/1
0.5 TABLET ORAL 2 TIMES DAILY
Qty: 60 TABLET | Refills: 0 | Status: SHIPPED | OUTPATIENT
Start: 2024-05-30 | End: 2024-06-29

## 2024-05-30 RX ORDER — ALPRAZOLAM 0.5 MG/1
0.5 TABLET ORAL 2 TIMES DAILY
COMMUNITY
Start: 2024-04-29

## 2024-05-30 RX ORDER — TRAZODONE HYDROCHLORIDE 100 MG/1
100 TABLET ORAL NIGHTLY
Qty: 90 TABLET | Refills: 1 | Status: SHIPPED | OUTPATIENT
Start: 2024-05-30

## 2024-05-30 ASSESSMENT — ENCOUNTER SYMPTOMS
WHEEZING: 0
NAUSEA: 0
SHORTNESS OF BREATH: 0
COUGH: 0
SORE THROAT: 0
ALLERGIC/IMMUNOLOGIC NEGATIVE: 1
CONSTIPATION: 0
BLOOD IN STOOL: 0
ABDOMINAL PAIN: 0

## 2024-05-30 NOTE — PATIENT INSTRUCTIONS
SURVEY:    You may be receiving a survey from [facility] regarding your visit today.    Please complete the survey to enable us to provide the highest quality of care to you and your family.    If you cannot score us a very good on any question, please call the office to discuss how we could have made your experience a very good one.    Thank you.  [facility]  MD Chana Klein, MD Williams Solis, DO  Neymar Sinha, MD Trisha Yeager, APRN-CNP  Starr Dunlap, Practice Manager  Taylor, CMA  Luci, CCMA  Keaton, CMA  Renu, CMA  [place], PSC   Shyla, LPN

## 2024-05-30 NOTE — PROGRESS NOTES
HPI Notes    Name: Corky Diamond  : 1965         Chief Complaint:     Chief Complaint   Patient presents with    Medication Check     Patient would like to discuss meds.       History of Present Illness:        HPI    Corky presents to office to follow-up for anxiety/depression disorder.  He is accompanied by his Sister Janna who is one of our nurse practitioners and helping him with his care  Corky is shaky and does not want to communicate  According to Janna the patient was seen by a psychiatric nurse practitioner in [place] in April and started on Risperdal for bipolar disorder.  Unfortunately developed elevated blood pressures for which she had to be treated with losartan.  Eventually Risperdal was weaned off and he was suggested to try Latuda.  It was started with a small dose and gradually increase to 40 mg.  Unfortunately he developed shakes, sweats, became very sad and confused and Jnana has been gradually tapering of the dosage of Latuda and presently takes 20 mg every other day.  He is currently on trazodone 100 mg nightly that helps him with insomnia.  He continues to take propranolol 40 mg twice daily.  He had to resume Xanax and takes 0.5 mg twice daily to help with anxiety and shakes and chills and is trying to wean this off as well.  Difficult situation as the patient has insurance that limits him to feel available psychiatric providers in the area.  Patient reports no suicidal ideations.      Past Medical History:     Past Medical History:   Diagnosis Date    Anxiety     Bipolar disorder (HCC)     Depression       Reviewed all health maintenance requirements and orderedappropriate tests  Health Maintenance Due   Topic Date Due    Hepatitis B vaccine (1 of 3 - 3-dose series) Never done    COVID-19 Vaccine (1) Never done    Shingles vaccine (1 of 2) Never done    Colorectal Cancer Screen  2022       Past Surgical History:     Past Surgical History:   Procedure Laterality Date    TOE

## 2024-06-27 ENCOUNTER — TELEPHONE (OUTPATIENT)
Dept: FAMILY MEDICINE CLINIC | Age: 59
End: 2024-06-27

## 2024-06-27 ENCOUNTER — HOSPITAL ENCOUNTER (OUTPATIENT)
Age: 59
Discharge: HOME | End: 2024-06-29
Payer: COMMERCIAL

## 2024-06-27 ENCOUNTER — HOSPITAL ENCOUNTER (OUTPATIENT)
Age: 59
Discharge: HOME | End: 2024-06-27
Payer: COMMERCIAL

## 2024-06-27 ENCOUNTER — HOSPITAL ENCOUNTER (OUTPATIENT)
Dept: GENERAL RADIOLOGY | Age: 59
Discharge: HOME | End: 2024-06-29
Payer: COMMERCIAL

## 2024-06-27 DIAGNOSIS — N20.0 KIDNEY STONE: ICD-10-CM

## 2024-06-27 DIAGNOSIS — N20.0 KIDNEY STONE: Primary | ICD-10-CM

## 2024-06-27 LAB
-: ABNORMAL
BACTERIA URNS QL MICRO: ABNORMAL
BILIRUB UR QL STRIP: NEGATIVE
CLARITY UR: ABNORMAL
COLOR UR: ABNORMAL
COMMENT: ABNORMAL
EPI CELLS #/AREA URNS HPF: ABNORMAL /HPF
GLUCOSE UR STRIP-MCNC: NEGATIVE MG/DL
HGB UR QL STRIP.AUTO: ABNORMAL
KETONES UR STRIP-MCNC: NEGATIVE MG/DL
LEUKOCYTE ESTERASE UR QL STRIP: ABNORMAL
NITRITE UR QL STRIP: NEGATIVE
PH UR STRIP: 6.5 [PH] (ref 5–8)
PROT UR STRIP-MCNC: ABNORMAL MG/DL
RBC #/AREA URNS HPF: ABNORMAL /HPF (ref 0–2)
SP GR UR STRIP: 1.01 (ref 1–1.03)
UROBILINOGEN UR STRIP-ACNC: NORMAL EU/DL (ref 0–1)
WBC #/AREA URNS HPF: ABNORMAL /HPF

## 2024-06-27 PROCEDURE — 74018 RADEX ABDOMEN 1 VIEW: CPT

## 2024-06-27 PROCEDURE — 87086 URINE CULTURE/COLONY COUNT: CPT

## 2024-06-27 PROCEDURE — 81001 URINALYSIS AUTO W/SCOPE: CPT

## 2024-06-27 NOTE — TELEPHONE ENCOUNTER
MATT CALLED SAYING HE HAS BLOOD IN HIS URINE AND THINKS HE HAS A KIDNEY STONE. WOULD YOU ORDER LAB AND A KUB

## 2024-06-28 LAB
MICROORGANISM SPEC CULT: NORMAL
SPECIMEN DESCRIPTION: NORMAL

## 2024-06-28 RX ORDER — CEPHALEXIN 500 MG/1
500 CAPSULE ORAL 2 TIMES DAILY
Qty: 14 CAPSULE | Refills: 0 | Status: SHIPPED | OUTPATIENT
Start: 2024-06-28 | End: 2024-07-05

## 2024-07-18 ENCOUNTER — OFFICE VISIT (OUTPATIENT)
Dept: FAMILY MEDICINE CLINIC | Age: 59
End: 2024-07-18
Payer: COMMERCIAL

## 2024-07-18 VITALS
SYSTOLIC BLOOD PRESSURE: 88 MMHG | OXYGEN SATURATION: 97 % | HEIGHT: 67 IN | HEART RATE: 64 BPM | RESPIRATION RATE: 18 BRPM | DIASTOLIC BLOOD PRESSURE: 58 MMHG | BODY MASS INDEX: 27.21 KG/M2 | WEIGHT: 173.4 LBS

## 2024-07-18 DIAGNOSIS — I95.2 HYPOTENSION DUE TO DRUGS: Primary | ICD-10-CM

## 2024-07-18 DIAGNOSIS — F31.70 BIPOLAR AFFECTIVE DISORDER IN REMISSION (HCC): ICD-10-CM

## 2024-07-18 PROCEDURE — 99214 OFFICE O/P EST MOD 30 MIN: CPT | Performed by: INTERNAL MEDICINE

## 2024-07-18 RX ORDER — ACETAMINOPHEN 160 MG
1 TABLET,DISINTEGRATING ORAL DAILY
Qty: 30 CAPSULE | Refills: 5 | Status: SHIPPED | OUTPATIENT
Start: 2024-07-18

## 2024-07-18 RX ORDER — ALPRAZOLAM 0.5 MG/1
1 TABLET ORAL 2 TIMES DAILY
Qty: 60 TABLET | Refills: 0 | Status: SHIPPED | OUTPATIENT
Start: 2024-07-18 | End: 2024-08-17

## 2024-07-18 RX ORDER — MIRTAZAPINE 30 MG/1
1 TABLET, FILM COATED ORAL DAILY
COMMUNITY
End: 2024-07-18

## 2024-07-18 RX ORDER — DIVALPROEX SODIUM 500 MG/1
500 TABLET, DELAYED RELEASE ORAL 2 TIMES DAILY
Qty: 60 TABLET | Refills: 3
Start: 2024-07-18

## 2024-07-18 RX ORDER — PROPRANOLOL HYDROCHLORIDE 40 MG/1
20 TABLET ORAL 2 TIMES DAILY
Qty: 90 TABLET | Refills: 1
Start: 2024-07-18

## 2024-07-18 RX ORDER — ACETAMINOPHEN 160 MG
1 TABLET,DISINTEGRATING ORAL DAILY
COMMUNITY
End: 2024-07-18 | Stop reason: SDUPTHER

## 2024-07-18 RX ORDER — OLANZAPINE 7.5 MG/1
7.5 TABLET, FILM COATED ORAL 2 TIMES DAILY
Qty: 60 TABLET | Refills: 3 | Status: SHIPPED | OUTPATIENT
Start: 2024-07-18

## 2024-07-18 RX ORDER — MIRTAZAPINE 30 MG/1
30 TABLET, FILM COATED ORAL DAILY
Qty: 30 TABLET | Refills: 5 | Status: CANCELLED | OUTPATIENT
Start: 2024-07-18

## 2024-07-18 SDOH — ECONOMIC STABILITY: FOOD INSECURITY: WITHIN THE PAST 12 MONTHS, YOU WORRIED THAT YOUR FOOD WOULD RUN OUT BEFORE YOU GOT MONEY TO BUY MORE.: NEVER TRUE

## 2024-07-18 SDOH — ECONOMIC STABILITY: FOOD INSECURITY: WITHIN THE PAST 12 MONTHS, THE FOOD YOU BOUGHT JUST DIDN'T LAST AND YOU DIDN'T HAVE MONEY TO GET MORE.: NEVER TRUE

## 2024-07-18 SDOH — ECONOMIC STABILITY: INCOME INSECURITY: HOW HARD IS IT FOR YOU TO PAY FOR THE VERY BASICS LIKE FOOD, HOUSING, MEDICAL CARE, AND HEATING?: NOT HARD AT ALL

## 2024-07-18 NOTE — PROGRESS NOTES
HPI Notes    Name: Corky Diamond  : 1965         Chief Complaint:     Chief Complaint   Patient presents with    Follow-up     6 week fu. Pt states he is doing better, eating better, no as nervous.   Pt is not wanting to do another cologuard.        History of Present Illness:        Corky presents to office to follow-up for his depression, bipolar disorder.  He is accompanied by his sister Janna who is one of our CNP's.    She has been very attentive and involved in his care.  Corky was hospitalized at [facility] inpatient psychiatric unit  - , was evaluated and treated by Eliezer Trivedi. Was told his symptoms were related to Depression and Anxiety   He followed up with YOVANY Chirinos on .   Started on Depakote, Olanzapine, Propranolol 40 mg bid  Blood pressure has been running low. Mood is better.   Also on Xanax prn and would like to taper down  Still on Trazodone 100 mg nightly. Sleeps well.   Has o suicidal ideations.               Past Medical History:     Past Medical History:   Diagnosis Date    Anxiety     Bipolar disorder (HCC)     Depression       Reviewed all health maintenance requirements and orderedappropriate tests  Health Maintenance Due   Topic Date Due    Hepatitis B vaccine (1 of 3 - 3-dose series) Never done    COVID-19 Vaccine (1) Never done    Shingles vaccine (1 of 2) Never done    Diabetes screen  2022    Colorectal Cancer Screen  2022       Past Surgical History:     Past Surgical History:   Procedure Laterality Date    TOE SURGERY          Medications:       Prior to Admission medications    Medication Sig Start Date End Date Taking? Authorizing Provider   Cholecalciferol (VITAMIN D3) 50 MCG ( UT) CAPS Take 1 capsule by mouth daily 24  Yes Alton Matthew MD   propranolol (INDERAL) 40 MG tablet Take 0.5 tablets by mouth 2 times daily 24  Yes Alton Matthew MD   ALPRAZolam (XANAX) 0.5 MG tablet Take 2 tablets by mouth 2 times

## 2024-07-20 ASSESSMENT — ENCOUNTER SYMPTOMS
COUGH: 0
SORE THROAT: 0
NAUSEA: 0
ABDOMINAL PAIN: 0
SHORTNESS OF BREATH: 0

## 2024-09-12 ENCOUNTER — OFFICE VISIT (OUTPATIENT)
Dept: FAMILY MEDICINE CLINIC | Age: 59
End: 2024-09-12
Payer: COMMERCIAL

## 2024-09-12 VITALS
BODY MASS INDEX: 30.79 KG/M2 | RESPIRATION RATE: 18 BRPM | DIASTOLIC BLOOD PRESSURE: 60 MMHG | HEART RATE: 90 BPM | OXYGEN SATURATION: 95 % | WEIGHT: 196.2 LBS | HEIGHT: 67 IN | SYSTOLIC BLOOD PRESSURE: 88 MMHG

## 2024-09-12 DIAGNOSIS — F31.70 BIPOLAR AFFECTIVE DISORDER IN REMISSION (HCC): ICD-10-CM

## 2024-09-12 PROCEDURE — 99213 OFFICE O/P EST LOW 20 MIN: CPT | Performed by: INTERNAL MEDICINE

## 2024-09-12 RX ORDER — DIVALPROEX SODIUM 500 MG/1
500 TABLET, DELAYED RELEASE ORAL 2 TIMES DAILY
Qty: 60 TABLET | Refills: 3 | Status: SHIPPED | OUTPATIENT
Start: 2024-09-12

## 2024-09-12 RX ORDER — PROPRANOLOL HYDROCHLORIDE 40 MG/1
20 TABLET ORAL 2 TIMES DAILY
Qty: 90 TABLET | Refills: 1 | Status: SHIPPED | OUTPATIENT
Start: 2024-09-12

## 2024-09-12 RX ORDER — OLANZAPINE 7.5 MG/1
7.5 TABLET, FILM COATED ORAL 2 TIMES DAILY
Qty: 60 TABLET | Refills: 5 | Status: SHIPPED | OUTPATIENT
Start: 2024-09-12

## 2024-09-12 RX ORDER — ACETAMINOPHEN 160 MG
1 TABLET,DISINTEGRATING ORAL
COMMUNITY
Start: 2024-06-11

## 2024-09-12 ASSESSMENT — ENCOUNTER SYMPTOMS
COUGH: 0
SHORTNESS OF BREATH: 0
ABDOMINAL PAIN: 0
NAUSEA: 0
ALLERGIC/IMMUNOLOGIC NEGATIVE: 1
SORE THROAT: 0
CONSTIPATION: 0
BLOOD IN STOOL: 0
WHEEZING: 0

## 2024-09-27 ENCOUNTER — TELEPHONE (OUTPATIENT)
Dept: FAMILY MEDICINE CLINIC | Age: 59
End: 2024-09-27

## 2024-09-27 NOTE — TELEPHONE ENCOUNTER
Patient not taking Olanzipine, should he restart and if so, does it need to be on a lower dose? Please advise. Thank you.

## 2024-09-30 NOTE — TELEPHONE ENCOUNTER
Contacted the patient and advised him of the recent phone message. Patient voiced understanding. Informed the patient to contact the office with further questions/concerns.

## 2024-11-12 ENCOUNTER — OFFICE VISIT (OUTPATIENT)
Dept: FAMILY MEDICINE CLINIC | Age: 59
End: 2024-11-12

## 2024-11-12 VITALS
WEIGHT: 199.5 LBS | HEART RATE: 65 BPM | BODY MASS INDEX: 31.31 KG/M2 | DIASTOLIC BLOOD PRESSURE: 64 MMHG | HEIGHT: 67 IN | SYSTOLIC BLOOD PRESSURE: 94 MMHG | OXYGEN SATURATION: 97 % | RESPIRATION RATE: 18 BRPM

## 2024-11-12 DIAGNOSIS — Z00.00 ENCOUNTER FOR PREVENTIVE CARE: ICD-10-CM

## 2024-11-12 DIAGNOSIS — Z13.1 DIABETES MELLITUS SCREENING: ICD-10-CM

## 2024-11-12 DIAGNOSIS — F31.70 BIPOLAR AFFECTIVE DISORDER IN REMISSION (HCC): Primary | ICD-10-CM

## 2024-11-12 RX ORDER — CHOLECALCIFEROL (VITAMIN D3) 10(400)/ML
50 DROPS ORAL DAILY
COMMUNITY
End: 2024-11-12

## 2024-11-12 ASSESSMENT — ENCOUNTER SYMPTOMS
NAUSEA: 0
SORE THROAT: 0
COUGH: 0
SHORTNESS OF BREATH: 0
ABDOMINAL PAIN: 0

## 2024-11-12 NOTE — PROGRESS NOTES
HPI Notes    Name: Corky Diamond  : 1965         Chief Complaint:     Chief Complaint   Patient presents with    Follow-up       History of Present Illness:        HPI    Corky presents to office to follow up for Bipolar disorder.    Today he looks great. He is talkative, happy. Says stopped taking his meds  Says his sister Janna  a medical provider recommended him to take his Depakote  Corky is not taking Zyprexa, Trazodone, propranolol, Depakote  He has been spending a lot of time on YouTube watching movies, news.  He has no complaints       Past Medical History:     Past Medical History:   Diagnosis Date    Anxiety     Bipolar disorder (HCC)     Depression       Reviewed all health maintenance requirements and orderedappropriate tests  Health Maintenance Due   Topic Date Due    Hepatitis B vaccine (1 of 3 - 19+ 3-dose series) Never done    Shingles vaccine (1 of 2) Never done    Diabetes screen  2022    Colorectal Cancer Screen  2022    Flu vaccine (1) 2024    COVID-19 Vaccine (3 - 2023-24 season) 2024       Past Surgical History:     Past Surgical History:   Procedure Laterality Date    TOE SURGERY          Medications:       Prior to Admission medications    Medication Sig Start Date End Date Taking? Authorizing Provider   Cholecalciferol (VITAMIN D3) 50 MCG (2000) CAPS 1 capsule  Patient not taking: Reported on 2024   Provider, MD Carmen   OLANZapine (ZYPREXA) 7.5 MG tablet Take 1 tablet by mouth 2 times daily  Patient not taking: Reported on 2024   Alton Matthew MD   propranolol (INDERAL) 40 MG tablet Take 0.5 tablets by mouth 2 times daily  Patient not taking: Reported on 2024   Alton Matthew MD   divalproex (DEPAKOTE) 500 MG DR tablet Take 1 tablet by mouth in the morning and at bedtime  Patient not taking: Reported on 2024   Alton Matthew MD   traZODone (DESYREL) 100 MG tablet Take 1 tablet by mouth

## 2025-02-11 ENCOUNTER — OFFICE VISIT (OUTPATIENT)
Dept: FAMILY MEDICINE CLINIC | Age: 60
End: 2025-02-11
Payer: COMMERCIAL

## 2025-02-11 VITALS
SYSTOLIC BLOOD PRESSURE: 118 MMHG | HEART RATE: 94 BPM | WEIGHT: 208.2 LBS | BODY MASS INDEX: 32.68 KG/M2 | RESPIRATION RATE: 18 BRPM | HEIGHT: 67 IN | DIASTOLIC BLOOD PRESSURE: 84 MMHG | OXYGEN SATURATION: 95 %

## 2025-02-11 DIAGNOSIS — Z12.5 PROSTATE CANCER SCREENING: ICD-10-CM

## 2025-02-11 DIAGNOSIS — Z00.00 MEDICARE ANNUAL WELLNESS VISIT, SUBSEQUENT: Primary | ICD-10-CM

## 2025-02-11 PROCEDURE — G0439 PPPS, SUBSEQ VISIT: HCPCS | Performed by: INTERNAL MEDICINE

## 2025-02-11 SDOH — ECONOMIC STABILITY: FOOD INSECURITY: WITHIN THE PAST 12 MONTHS, THE FOOD YOU BOUGHT JUST DIDN'T LAST AND YOU DIDN'T HAVE MONEY TO GET MORE.: NEVER TRUE

## 2025-02-11 SDOH — ECONOMIC STABILITY: FOOD INSECURITY: WITHIN THE PAST 12 MONTHS, YOU WORRIED THAT YOUR FOOD WOULD RUN OUT BEFORE YOU GOT MONEY TO BUY MORE.: NEVER TRUE

## 2025-02-11 ASSESSMENT — PATIENT HEALTH QUESTIONNAIRE - PHQ9
1. LITTLE INTEREST OR PLEASURE IN DOING THINGS: NOT AT ALL
SUM OF ALL RESPONSES TO PHQ9 QUESTIONS 1 & 2: 0
5. POOR APPETITE OR OVEREATING: NOT AT ALL
SUM OF ALL RESPONSES TO PHQ QUESTIONS 1-9: 0
9. THOUGHTS THAT YOU WOULD BE BETTER OFF DEAD, OR OF HURTING YOURSELF: NOT AT ALL
SUM OF ALL RESPONSES TO PHQ QUESTIONS 1-9: 0
SUM OF ALL RESPONSES TO PHQ QUESTIONS 1-9: 0
3. TROUBLE FALLING OR STAYING ASLEEP: NOT AT ALL
7. TROUBLE CONCENTRATING ON THINGS, SUCH AS READING THE NEWSPAPER OR WATCHING TELEVISION: NOT AT ALL
SUM OF ALL RESPONSES TO PHQ QUESTIONS 1-9: 0
4. FEELING TIRED OR HAVING LITTLE ENERGY: NOT AT ALL
6. FEELING BAD ABOUT YOURSELF - OR THAT YOU ARE A FAILURE OR HAVE LET YOURSELF OR YOUR FAMILY DOWN: NOT AT ALL
10. IF YOU CHECKED OFF ANY PROBLEMS, HOW DIFFICULT HAVE THESE PROBLEMS MADE IT FOR YOU TO DO YOUR WORK, TAKE CARE OF THINGS AT HOME, OR GET ALONG WITH OTHER PEOPLE: NOT DIFFICULT AT ALL
2. FEELING DOWN, DEPRESSED OR HOPELESS: NOT AT ALL
8. MOVING OR SPEAKING SO SLOWLY THAT OTHER PEOPLE COULD HAVE NOTICED. OR THE OPPOSITE, BEING SO FIGETY OR RESTLESS THAT YOU HAVE BEEN MOVING AROUND A LOT MORE THAN USUAL: NOT AT ALL

## 2025-02-11 ASSESSMENT — LIFESTYLE VARIABLES
HOW OFTEN DO YOU HAVE A DRINK CONTAINING ALCOHOL: MONTHLY OR LESS
HAS A RELATIVE, FRIEND, DOCTOR, OR ANOTHER HEALTH PROFESSIONAL EXPRESSED CONCERN ABOUT YOUR DRINKING OR SUGGESTED YOU CUT DOWN: NO
HOW OFTEN DURING THE LAST YEAR HAVE YOU BEEN UNABLE TO REMEMBER WHAT HAPPENED THE NIGHT BEFORE BECAUSE YOU HAD BEEN DRINKING: NEVER
HOW MANY STANDARD DRINKS CONTAINING ALCOHOL DO YOU HAVE ON A TYPICAL DAY: 1 OR 2
HOW OFTEN DURING THE LAST YEAR HAVE YOU FOUND THAT YOU WERE NOT ABLE TO STOP DRINKING ONCE YOU HAD STARTED: NEVER
HOW OFTEN DURING THE LAST YEAR HAVE YOU HAD A FEELING OF GUILT OR REMORSE AFTER DRINKING: NEVER
HOW OFTEN DURING THE LAST YEAR HAVE YOU FAILED TO DO WHAT WAS NORMALLY EXPECTED FROM YOU BECAUSE OF DRINKING: NEVER
HOW OFTEN DURING THE LAST YEAR HAVE YOU NEEDED AN ALCOHOLIC DRINK FIRST THING IN THE MORNING TO GET YOURSELF GOING AFTER A NIGHT OF HEAVY DRINKING: NEVER
HAVE YOU OR SOMEONE ELSE BEEN INJURED AS A RESULT OF YOUR DRINKING: NO

## 2025-02-11 NOTE — PROGRESS NOTES
Medicare Annual Wellness Visit    Corky Diamond is here for Medicare AWV    Assessment & Plan        No follow-ups on file.     Subjective       Patient's complete Health Risk Assessment and screening values have been reviewed and are found in Flowsheets. The following problems were reviewed today and where indicated follow up appointments were made and/or referrals ordered.    Positive Risk Factor Screenings with Interventions:              Inactivity:  On average, how many days per week do you engage in moderate to strenuous exercise (like a brisk walk)?: 0 days (!) Abnormal  On average, how many minutes do you engage in exercise at this level?: 0 min    Interventions:  Patient comments: he has been thinking about joining gym but not motivated at this time   Recommendations: patient agrees to increase physical activity as follows: advised to ride his bike, go for walks     Poor Eating Habits/Diet:  Do you eat balanced/healthy meals regularly?: (!) No    Interventions:  Patient comments: only eats one meal a day but consumes a lot of candy bars,   eliminate/reduce sweets    Abnormal BMI (obese):  Body mass index is 32.61 kg/m². (!) Abnormal    Interventions:            Vision Screen:  Do you have difficulty driving, watching TV, or doing any of your daily activities because of your eyesight?: No  Have you had an eye exam within the past year?: (!) No    Interventions:   Patient encouraged to make appointment with their eye specialist    Safety:  Do you always fasten your seatbelt when you are in a car?: (!) No    Interventions:  Safety tips dicussed      Advanced Directives:  Do you have a Living Will?: (!) No    Intervention:  has NO advanced directive - not interested in additional information                     Objective   Vitals:    02/11/25 1512   BP: 118/84   Site: Right Upper Arm   Position: Sitting   Cuff Size: Medium Adult   Pulse: 94   Resp: 18   SpO2: 95%   Weight: 94.4 kg (208 lb 3.2 oz)   Height:

## 2025-02-11 NOTE — PATIENT INSTRUCTIONS
you have any problems.  Where can you learn more?  Go to [email or web address].net/patientEd and enter F075 to learn more about \"A Healthy Heart: Care Instructions.\"  Current as of: July 31, 2024  Content Version: 14.3  © 2024 [employer].   Care instructions adapted under license by [employer]. If you have questions about a medical condition or this instruction, always ask your healthcare professional. "[employer], disclaims any warranty or liability for your use of this information.    Personalized Preventive Plan for Corky Diamond - 2/11/2025  Medicare offers a range of preventive health benefits. Some of the tests and screenings are paid in full while other may be subject to a deductible, co-insurance, and/or copay.  Some of these benefits include a comprehensive review of your medical history including lifestyle, illnesses that may run in your family, and various assessments and screenings as appropriate.  After reviewing your medical record and screening and assessments performed today your provider may have ordered immunizations, labs, imaging, and/or referrals for you.  A list of these orders (if applicable) as well as your Preventive Care list are included within your After Visit Summary for your review.

## 2025-04-03 ENCOUNTER — OFFICE VISIT (OUTPATIENT)
Dept: FAMILY MEDICINE CLINIC | Age: 60
End: 2025-04-03
Payer: COMMERCIAL

## 2025-04-03 VITALS
DIASTOLIC BLOOD PRESSURE: 82 MMHG | RESPIRATION RATE: 18 BRPM | BODY MASS INDEX: 32.8 KG/M2 | OXYGEN SATURATION: 96 % | HEIGHT: 67 IN | WEIGHT: 209 LBS | SYSTOLIC BLOOD PRESSURE: 108 MMHG

## 2025-04-03 DIAGNOSIS — R00.0 TACHYCARDIA: Primary | ICD-10-CM

## 2025-04-03 DIAGNOSIS — Z12.11 COLON CANCER SCREENING: ICD-10-CM

## 2025-04-03 PROCEDURE — 99213 OFFICE O/P EST LOW 20 MIN: CPT | Performed by: INTERNAL MEDICINE

## 2025-04-03 ASSESSMENT — ENCOUNTER SYMPTOMS
SORE THROAT: 0
BLOOD IN STOOL: 0
CONSTIPATION: 0
WHEEZING: 0
SHORTNESS OF BREATH: 0
ALLERGIC/IMMUNOLOGIC NEGATIVE: 1
ABDOMINAL PAIN: 0
NAUSEA: 0
COUGH: 0

## 2025-04-03 NOTE — PROGRESS NOTES
HPI Notes    Name: Corky Diamond  : 1965         Chief Complaint:     Chief Complaint   Patient presents with    Tachycardia       History of Present Illness:        Corky presents to office for evaluation of tachycardia  States about one month ago when he was getting labs done was told his HR was 102.  He has no palpitations, CP, SOB, dizziness.   He feels good overall.   States take magnesium and vit D3.   States mood is very stable, he is generally happy . Spends time with friends.         Tachycardia  This is a new problem. The current episode started 1 to 4 weeks ago. The problem occurs rarely. Pertinent negatives include no abdominal pain, chest pain, congestion, coughing, headaches, nausea, rash, sore throat or weakness. The symptoms are aggravated by exertion. He has tried nothing for the symptoms.           Past Medical History:     Past Medical History:   Diagnosis Date    Anxiety     Bipolar disorder     Depression       Reviewed all health maintenance requirements and orderedappropriate tests  Health Maintenance Due   Topic Date Due    Hepatitis B vaccine (1 of 3 - 19+ 3-dose series) Never done    Shingles vaccine (1 of 2) Never done    Pneumococcal 50+ years Vaccine (1 of 1 - PCV) Never done    Diabetes screen  2022    Colorectal Cancer Screen  2022    COVID-19 Vaccine (3 - 2024- season) 2024       Past Surgical History:     Past Surgical History:   Procedure Laterality Date    TOE SURGERY          Medications:       Prior to Admission medications    Medication Sig Start Date End Date Taking? Authorizing Provider   Cholecalciferol (VITAMIN D3) 50 MCG (2000) CAPS 1 capsule  Patient not taking: Reported on 2024   Provider, MD Carmen   OLANZapine (ZYPREXA) 7.5 MG tablet Take 1 tablet by mouth 2 times daily  Patient not taking: Reported on 2024   Alton Matthew MD   propranolol (INDERAL) 40 MG tablet Take 0.5 tablets by mouth 2 times

## 2025-04-03 NOTE — PATIENT INSTRUCTIONS
SURVEY:    You may be receiving a survey from [facility] regarding your visit today.    Please complete the survey to enable us to provide the highest quality of care to you and your family.    If you cannot score us a very good on any question, please call the office to discuss how we could have made your experience a very good one.    Thank you.    [facility]  MD Williams Klein, DO Leonarda Wang, CNP  Neymar Sinha, MD Trisha Yeager, APRN-CNP  Starr Dunlap, Practice Manager  Jennifer, ARGENTINA Verma, CCMELAINE Pugh, CMA  Renu, CMA  [place], PSC   Shyla, LPN  Gloria, CMA

## 2025-04-16 ENCOUNTER — TELEPHONE (OUTPATIENT)
Dept: FAMILY MEDICINE CLINIC | Age: 60
End: 2025-04-16

## 2025-04-16 RX ORDER — AZITHROMYCIN 250 MG/1
TABLET, FILM COATED ORAL
Qty: 6 TABLET | Refills: 0 | Status: SHIPPED | OUTPATIENT
Start: 2025-04-16 | End: 2025-04-26

## 2025-04-16 NOTE — TELEPHONE ENCOUNTER
Patient is asking for an RX to treat his cough and runny nose feeling miserable. Patient is requesting a Zpac sent to [facility]. Please advise.

## 2025-04-28 ENCOUNTER — TELEPHONE (OUTPATIENT)
Dept: FAMILY MEDICINE CLINIC | Age: 60
End: 2025-04-28

## 2025-04-28 NOTE — TELEPHONE ENCOUNTER
Patient requesting additional labs to be ordered, Patient having itchy and rawness in groin are states he is concerned he may have syphilis, please advise

## 2025-04-29 ENCOUNTER — HOSPITAL ENCOUNTER (OUTPATIENT)
Age: 60
Discharge: HOME | End: 2025-04-29
Payer: COMMERCIAL

## 2025-04-29 DIAGNOSIS — Z13.1 DIABETES MELLITUS SCREENING: ICD-10-CM

## 2025-04-29 DIAGNOSIS — Z00.00 ENCOUNTER FOR PREVENTIVE CARE: ICD-10-CM

## 2025-04-29 DIAGNOSIS — Z12.5 PROSTATE CANCER SCREENING: ICD-10-CM

## 2025-04-29 LAB
ALBUMIN SERPL-MCNC: 4.3 G/DL (ref 3.5–5.2)
ALBUMIN/GLOB SERPL: 1.5 {RATIO} (ref 1–2.5)
ALP SERPL-CCNC: 70 U/L (ref 40–129)
ALT SERPL-CCNC: 26 U/L (ref 10–50)
ANION GAP SERPL CALCULATED.3IONS-SCNC: 9 MMOL/L (ref 9–16)
AST SERPL-CCNC: 34 U/L (ref 10–50)
BASOPHILS # BLD: 0.05 K/UL (ref 0–0.2)
BASOPHILS NFR BLD: 1 % (ref 0–2)
BILIRUB DIRECT SERPL-MCNC: 0.2 MG/DL (ref 0–0.3)
BILIRUB INDIRECT SERPL-MCNC: 0.5 MG/DL (ref 0–1)
BILIRUB SERPL-MCNC: 0.7 MG/DL (ref 0–1.2)
BUN SERPL-MCNC: 22 MG/DL (ref 6–20)
BUN/CREAT SERPL: 20 (ref 9–20)
CALCIUM SERPL-MCNC: 9.6 MG/DL (ref 8.6–10.4)
CHLORIDE SERPL-SCNC: 105 MMOL/L (ref 98–107)
CO2 SERPL-SCNC: 26 MMOL/L (ref 20–31)
CREAT SERPL-MCNC: 1.1 MG/DL (ref 0.7–1.2)
EOSINOPHIL # BLD: 0.15 K/UL (ref 0–0.44)
EOSINOPHILS RELATIVE PERCENT: 3 % (ref 1–4)
ERYTHROCYTE [DISTWIDTH] IN BLOOD BY AUTOMATED COUNT: 12.7 % (ref 11.8–14.4)
GFR, ESTIMATED: 78 ML/MIN/1.73M2
GLUCOSE SERPL-MCNC: 122 MG/DL (ref 74–99)
HCT VFR BLD AUTO: 45.2 % (ref 40.7–50.3)
HGB BLD-MCNC: 15 G/DL (ref 13–17)
IMM GRANULOCYTES # BLD AUTO: <0.03 K/UL (ref 0–0.3)
IMM GRANULOCYTES NFR BLD: 0 %
LYMPHOCYTES NFR BLD: 1.5 K/UL (ref 1.1–3.7)
LYMPHOCYTES RELATIVE PERCENT: 25 % (ref 24–43)
MCH RBC QN AUTO: 30.1 PG (ref 25.2–33.5)
MCHC RBC AUTO-ENTMCNC: 33.2 G/DL (ref 28.4–34.8)
MCV RBC AUTO: 90.6 FL (ref 82.6–102.9)
MONOCYTES NFR BLD: 0.46 K/UL (ref 0.1–1.2)
MONOCYTES NFR BLD: 8 % (ref 3–12)
NEUTROPHILS NFR BLD: 63 % (ref 36–65)
NEUTS SEG NFR BLD: 3.75 K/UL (ref 1.5–8.1)
NRBC BLD-RTO: 0 PER 100 WBC
PLATELET # BLD AUTO: 259 K/UL (ref 138–453)
PMV BLD AUTO: 9.1 FL (ref 8.1–13.5)
POTASSIUM SERPL-SCNC: 4.8 MMOL/L (ref 3.7–5.3)
PROT SERPL-MCNC: 7.2 G/DL (ref 6.6–8.7)
PSA SERPL-MCNC: 1.29 NG/ML (ref 0–4)
RBC # BLD AUTO: 4.99 M/UL (ref 4.21–5.77)
SODIUM SERPL-SCNC: 140 MMOL/L (ref 136–145)
TSH SERPL DL<=0.05 MIU/L-ACNC: 1.58 UIU/ML (ref 0.27–4.2)
WBC OTHER # BLD: 5.9 K/UL (ref 3.5–11.3)

## 2025-04-29 PROCEDURE — 36415 COLL VENOUS BLD VENIPUNCTURE: CPT

## 2025-04-29 PROCEDURE — G0103 PSA SCREENING: HCPCS

## 2025-04-29 PROCEDURE — 85025 COMPLETE CBC W/AUTO DIFF WBC: CPT

## 2025-04-29 PROCEDURE — 80048 BASIC METABOLIC PNL TOTAL CA: CPT

## 2025-04-29 PROCEDURE — 84443 ASSAY THYROID STIM HORMONE: CPT

## 2025-04-29 PROCEDURE — 80076 HEPATIC FUNCTION PANEL: CPT

## 2025-04-30 ENCOUNTER — RESULTS FOLLOW-UP (OUTPATIENT)
Dept: FAMILY MEDICINE CLINIC | Age: 60
End: 2025-04-30

## 2025-04-30 NOTE — TELEPHONE ENCOUNTER
----- Message from Dr. Alton Matthew MD sent at 4/29/2025  6:20 PM EDT -----  Please let the patient know his glucose is elevated and I recommend A1C check. The rest of the labs look good  Thanks  ----- Message -----  From: Jyotsna Pollard Incoming Lab Results From [facility]  Sent: 4/29/2025  11:33 AM EDT  To: Alton Matthew MD

## 2025-04-30 NOTE — TELEPHONE ENCOUNTER
LVM advising patient results and will run an A1C check in clinic 5/1/2025   Patient is calm, pleasant, and cooperative. Visible on milieu playing dominos and watching TV. Interacting w/ peers. BS 99. Appetite and hygiene is adequate. Attending most groups. Q 7 minute continuous rounding maintained.

## 2025-05-01 ENCOUNTER — OFFICE VISIT (OUTPATIENT)
Dept: FAMILY MEDICINE CLINIC | Age: 60
End: 2025-05-01
Payer: COMMERCIAL

## 2025-05-01 VITALS
RESPIRATION RATE: 18 BRPM | BODY MASS INDEX: 32.8 KG/M2 | WEIGHT: 209 LBS | SYSTOLIC BLOOD PRESSURE: 98 MMHG | OXYGEN SATURATION: 97 % | HEIGHT: 67 IN | HEART RATE: 94 BPM | DIASTOLIC BLOOD PRESSURE: 82 MMHG

## 2025-05-01 DIAGNOSIS — Z20.2 STD EXPOSURE: ICD-10-CM

## 2025-05-01 DIAGNOSIS — R73.03 PREDIABETES: ICD-10-CM

## 2025-05-01 DIAGNOSIS — B37.49 CANDIDIASIS OF PERINEUM: ICD-10-CM

## 2025-05-01 DIAGNOSIS — A64 STD (MALE): Primary | ICD-10-CM

## 2025-05-01 LAB — HBA1C MFR BLD: 5.8 %

## 2025-05-01 PROCEDURE — 83036 HEMOGLOBIN GLYCOSYLATED A1C: CPT | Performed by: INTERNAL MEDICINE

## 2025-05-01 PROCEDURE — 99214 OFFICE O/P EST MOD 30 MIN: CPT | Performed by: INTERNAL MEDICINE

## 2025-05-01 RX ORDER — FLUCONAZOLE 100 MG/1
100 TABLET ORAL DAILY
Qty: 7 TABLET | Refills: 0 | Status: SHIPPED | OUTPATIENT
Start: 2025-05-01 | End: 2025-05-08

## 2025-05-01 RX ORDER — CLOTRIMAZOLE 1 %
CREAM (GRAM) TOPICAL
Qty: 60 G | Refills: 1 | Status: SHIPPED | OUTPATIENT
Start: 2025-05-01 | End: 2025-05-08

## 2025-05-01 ASSESSMENT — ENCOUNTER SYMPTOMS
ALLERGIC/IMMUNOLOGIC NEGATIVE: 1
DIARRHEA: 0
COUGH: 0
SORE THROAT: 0
SHORTNESS OF BREATH: 0
NAUSEA: 0
WHEEZING: 0
BLOOD IN STOOL: 0
CONSTIPATION: 0
ABDOMINAL PAIN: 0

## 2025-05-01 NOTE — PROGRESS NOTES
Negative for visual disturbance.   Respiratory:  Negative for cough, shortness of breath and wheezing.    Cardiovascular:  Negative for chest pain, palpitations and leg swelling.   Gastrointestinal:  Negative for abdominal pain, blood in stool, constipation, diarrhea and nausea.   Endocrine: Negative for cold intolerance, heat intolerance, polydipsia and polyuria.   Genitourinary:  Negative for decreased urine volume, difficulty urinating, dysuria, flank pain, frequency, penile discharge, penile pain, scrotal swelling and testicular pain.   Musculoskeletal: Negative.    Skin:  Positive for rash.   Allergic/Immunologic: Negative.    Neurological:  Negative for weakness and headaches.   Psychiatric/Behavioral:  Negative for behavioral problems and dysphoric mood. The patient is not nervous/anxious.          Physical Exam:     Vitals:  BP 98/82 (BP Site: Left Upper Arm, Patient Position: Sitting, BP Cuff Size: Medium Adult)   Pulse 94   Resp 18   Ht 1.702 m (5' 7\")   Wt 94.8 kg (209 lb)   SpO2 97%   BMI 32.73 kg/m²       Physical Exam  Vitals reviewed.   Constitutional:       General: He is not in acute distress.     Appearance: Normal appearance. He is well-developed. He is not ill-appearing.   HENT:      Head: Normocephalic and atraumatic.   Neck:      Thyroid: No thyromegaly.   Cardiovascular:      Rate and Rhythm: Normal rate and regular rhythm.      Heart sounds: Normal heart sounds. No murmur heard.  Pulmonary:      Effort: Pulmonary effort is normal.      Breath sounds: Normal breath sounds. No wheezing or rales.   Abdominal:      General: Bowel sounds are normal. There is no distension.      Palpations: Abdomen is soft. There is no mass.      Tenderness: There is no abdominal tenderness.   Musculoskeletal:         General: Normal range of motion.      Right lower leg: No edema.      Left lower leg: No edema.   Lymphadenopathy:      Cervical: No cervical adenopathy.   Skin:     General: Skin is warm and

## 2025-05-01 NOTE — PATIENT INSTRUCTIONS
SURVEY:    You may be receiving a survey from [facility] regarding your visit today.    Please complete the survey to enable us to provide the highest quality of care to you and your family.    If you cannot score us a very good on any question, please call the office to discuss how we could have made your experience a very good one.    Thank you.    [facility]  MD Williams Klein, DO Leonarda Wang, CNP  Neymar Sinha, MD Trisha Yeager, APRN-CNP  Starr Dunlap, Practice Manager  Jennifer, ARGENTINA Verma, CCMELAINE Pugh, CMA  Renu, CMA  [place], PSC   Shyla, LPN  Gloria, CMA

## 2025-05-12 ENCOUNTER — OFFICE VISIT (OUTPATIENT)
Dept: FAMILY MEDICINE CLINIC | Age: 60
End: 2025-05-12
Payer: COMMERCIAL

## 2025-05-12 VITALS
BODY MASS INDEX: 33.27 KG/M2 | WEIGHT: 212 LBS | OXYGEN SATURATION: 95 % | HEART RATE: 85 BPM | HEIGHT: 67 IN | DIASTOLIC BLOOD PRESSURE: 82 MMHG | RESPIRATION RATE: 18 BRPM | SYSTOLIC BLOOD PRESSURE: 108 MMHG

## 2025-05-12 DIAGNOSIS — F51.05 INSOMNIA DUE TO OTHER MENTAL DISORDER: ICD-10-CM

## 2025-05-12 DIAGNOSIS — F99 INSOMNIA DUE TO OTHER MENTAL DISORDER: ICD-10-CM

## 2025-05-12 DIAGNOSIS — F31.12 BIPOLAR 1 DISORDER, MANIC, MODERATE (HCC): Primary | ICD-10-CM

## 2025-05-12 LAB — NONINV COLON CA DNA+OCC BLD SCRN STL QL: NEGATIVE

## 2025-05-12 PROCEDURE — 99214 OFFICE O/P EST MOD 30 MIN: CPT | Performed by: INTERNAL MEDICINE

## 2025-05-12 RX ORDER — MELATONIN 10 MG
10 CAPSULE ORAL NIGHTLY PRN
Qty: 30 CAPSULE | Refills: 0 | Status: SHIPPED | OUTPATIENT
Start: 2025-05-12

## 2025-05-12 ASSESSMENT — ENCOUNTER SYMPTOMS
CONSTIPATION: 0
NAUSEA: 0
ALLERGIC/IMMUNOLOGIC NEGATIVE: 1
SORE THROAT: 0
COUGH: 0
BLOOD IN STOOL: 0
WHEEZING: 0
ABDOMINAL PAIN: 0
SHORTNESS OF BREATH: 0

## 2025-05-12 NOTE — PROGRESS NOTES
HPI Notes    Name: Corky Diamond  : 1965         Chief Complaint:     Chief Complaint   Patient presents with    Anxiety       History of Present Illness:        HPI    Corky presents to office to follow up for Bipolar disorder, Anxiety  He has h/o Bipolar disorder for many years. Says his symptoms are controlled. Currently not taking any meds. Says not interested in getting on meds. He is not in counseling. He reports having trouble sleeping. He tried melatonin in the past. Does not remember if it helped him. He would like to try it again   Reports no suicide ideations. Says he feels happy. He likes spending time on Facebook. Likes to discuss politics and humorous subjects.      Past Medical History:     Past Medical History:   Diagnosis Date    Anxiety     Bipolar disorder (HCC)     Depression       Reviewed all health maintenance requirements and orderedappropriate tests  Health Maintenance Due   Topic Date Due    Hepatitis B vaccine (1 of 3 - 19+ 3-dose series) Never done    Shingles vaccine (1 of 2) Never done    Pneumococcal 50+ years Vaccine (1 of 1 - PCV) Never done    COVID-19 Vaccine (3 - 2024-25 season) 2024       Past Surgical History:     Past Surgical History:   Procedure Laterality Date    TOE SURGERY          Medications:       Prior to Admission medications    Medication Sig Start Date End Date Taking? Authorizing Provider   melatonin 10 MG CAPS capsule Take 1 capsule by mouth nightly as needed (Insomnia) 25  Yes Alton Matthew MD   Cholecalciferol (VITAMIN D3) 50 MCG (2000) CAPS 1 capsule  Patient not taking: Reported on 2024   Provider, MD Carmen   OLANZapine (ZYPREXA) 7.5 MG tablet Take 1 tablet by mouth 2 times daily  Patient not taking: Reported on 2024   Alton Matthew MD   propranolol (INDERAL) 40 MG tablet Take 0.5 tablets by mouth 2 times daily  Patient not taking: Reported on 2024   Alton Matthew MD

## 2025-05-12 NOTE — PATIENT INSTRUCTIONS
SURVEY:    You may be receiving a survey from [facility] regarding your visit today.    Please complete the survey to enable us to provide the highest quality of care to you and your family.    If you cannot score us a very good on any question, please call the office to discuss how we could have made your experience a very good one.    Thank you.    [facility]  MD Williams Klein, DO Leonarda Wang, CNP  Neymar Sinha, MD Trisha Yeager, APRN-CNP  Starr Dunlap, Practice Manager  Jennifer, ARGENTINA Verma, CCMELAINE Pugh, CMA  Renu, CMA  [place], PSC   Shyla, LPN  Gloria, CMA

## 2025-05-15 ENCOUNTER — TELEPHONE (OUTPATIENT)
Dept: FAMILY MEDICINE CLINIC | Age: 60
End: 2025-05-15